# Patient Record
Sex: FEMALE | Race: WHITE | NOT HISPANIC OR LATINO
[De-identification: names, ages, dates, MRNs, and addresses within clinical notes are randomized per-mention and may not be internally consistent; named-entity substitution may affect disease eponyms.]

---

## 2017-08-31 PROBLEM — Z00.00 ENCOUNTER FOR PREVENTIVE HEALTH EXAMINATION: Status: ACTIVE | Noted: 2017-08-31

## 2017-09-25 ENCOUNTER — APPOINTMENT (OUTPATIENT)
Dept: INTERNAL MEDICINE | Facility: CLINIC | Age: 82
End: 2017-09-25

## 2021-05-13 ENCOUNTER — INPATIENT (INPATIENT)
Facility: HOSPITAL | Age: 86
LOS: 5 days | Discharge: NURSING, REHAB OR LONG-TERM CARE | DRG: 956 | End: 2021-05-19
Attending: STUDENT IN AN ORGANIZED HEALTH CARE EDUCATION/TRAINING PROGRAM | Admitting: STUDENT IN AN ORGANIZED HEALTH CARE EDUCATION/TRAINING PROGRAM
Payer: MEDICARE

## 2021-05-13 VITALS
OXYGEN SATURATION: 95 % | HEIGHT: 59 IN | WEIGHT: 130.07 LBS | RESPIRATION RATE: 16 BRPM | TEMPERATURE: 98 F | HEART RATE: 84 BPM | DIASTOLIC BLOOD PRESSURE: 60 MMHG | SYSTOLIC BLOOD PRESSURE: 109 MMHG

## 2021-05-13 LAB
APPEARANCE UR: ABNORMAL
APTT BLD: 27.3 SEC — LOW (ref 27.5–35.5)
BACTERIA # UR AUTO: PRESENT /HPF
BILIRUB UR-MCNC: NEGATIVE — SIGNIFICANT CHANGE UP
BLD GP AB SCN SERPL QL: NEGATIVE — SIGNIFICANT CHANGE UP
COLOR SPEC: YELLOW — SIGNIFICANT CHANGE UP
DIFF PNL FLD: ABNORMAL
EPI CELLS # UR: SIGNIFICANT CHANGE UP /HPF (ref 0–5)
GLUCOSE UR QL: NEGATIVE — SIGNIFICANT CHANGE UP
INR BLD: 0.97 — SIGNIFICANT CHANGE UP (ref 0.88–1.16)
KETONES UR-MCNC: 15 MG/DL
LEUKOCYTE ESTERASE UR-ACNC: NEGATIVE — SIGNIFICANT CHANGE UP
NITRITE UR-MCNC: POSITIVE
PH UR: 5.5 — SIGNIFICANT CHANGE UP (ref 5–8)
PROT UR-MCNC: 30 MG/DL
PROTHROM AB SERPL-ACNC: 11.6 SEC — SIGNIFICANT CHANGE UP (ref 10.6–13.6)
RBC CASTS # UR COMP ASSIST: < 5 /HPF — SIGNIFICANT CHANGE UP
RH IG SCN BLD-IMP: NEGATIVE — SIGNIFICANT CHANGE UP
SP GR SPEC: >=1.03 — SIGNIFICANT CHANGE UP (ref 1–1.03)
UROBILINOGEN FLD QL: 1 E.U./DL — SIGNIFICANT CHANGE UP
WBC UR QL: ABNORMAL /HPF

## 2021-05-13 PROCEDURE — 73552 X-RAY EXAM OF FEMUR 2/>: CPT | Mod: 26,LT

## 2021-05-13 PROCEDURE — 70450 CT HEAD/BRAIN W/O DYE: CPT | Mod: 26,MH

## 2021-05-13 PROCEDURE — 71250 CT THORAX DX C-: CPT | Mod: 26,MC

## 2021-05-13 PROCEDURE — 72100 X-RAY EXAM L-S SPINE 2/3 VWS: CPT | Mod: 26

## 2021-05-13 PROCEDURE — 71045 X-RAY EXAM CHEST 1 VIEW: CPT | Mod: 26

## 2021-05-13 PROCEDURE — 73521 X-RAY EXAM HIPS BI 2 VIEWS: CPT | Mod: 26

## 2021-05-13 PROCEDURE — 99285 EMERGENCY DEPT VISIT HI MDM: CPT | Mod: 25

## 2021-05-13 PROCEDURE — 72125 CT NECK SPINE W/O DYE: CPT | Mod: 26,MH

## 2021-05-13 PROCEDURE — 99223 1ST HOSP IP/OBS HIGH 75: CPT

## 2021-05-13 PROCEDURE — 72081 X-RAY EXAM ENTIRE SPI 1 VW: CPT | Mod: 26

## 2021-05-13 RX ORDER — ERGOCALCIFEROL 1.25 MG/1
0 CAPSULE ORAL
Qty: 0 | Refills: 0 | DISCHARGE

## 2021-05-13 RX ORDER — CEFTRIAXONE 500 MG/1
1000 INJECTION, POWDER, FOR SOLUTION INTRAMUSCULAR; INTRAVENOUS EVERY 24 HOURS
Refills: 0 | Status: COMPLETED | OUTPATIENT
Start: 2021-05-14 | End: 2021-05-15

## 2021-05-13 RX ORDER — ATORVASTATIN CALCIUM 80 MG/1
10 TABLET, FILM COATED ORAL AT BEDTIME
Refills: 0 | Status: DISCONTINUED | OUTPATIENT
Start: 2021-05-13 | End: 2021-05-19

## 2021-05-13 RX ORDER — CHLORHEXIDINE GLUCONATE 213 G/1000ML
1 SOLUTION TOPICAL EVERY 12 HOURS
Refills: 0 | Status: DISCONTINUED | OUTPATIENT
Start: 2021-05-13 | End: 2021-05-14

## 2021-05-13 RX ORDER — ACETAMINOPHEN 500 MG
650 TABLET ORAL EVERY 6 HOURS
Refills: 0 | Status: DISCONTINUED | OUTPATIENT
Start: 2021-05-13 | End: 2021-05-15

## 2021-05-13 RX ORDER — CEFTRIAXONE 500 MG/1
1000 INJECTION, POWDER, FOR SOLUTION INTRAMUSCULAR; INTRAVENOUS EVERY 24 HOURS
Refills: 0 | Status: DISCONTINUED | OUTPATIENT
Start: 2021-05-13 | End: 2021-05-13

## 2021-05-13 RX ORDER — SODIUM CHLORIDE 9 MG/ML
1000 INJECTION, SOLUTION INTRAVENOUS
Refills: 0 | Status: DISCONTINUED | OUTPATIENT
Start: 2021-05-13 | End: 2021-05-14

## 2021-05-13 RX ORDER — MORPHINE SULFATE 50 MG/1
2 CAPSULE, EXTENDED RELEASE ORAL ONCE
Refills: 0 | Status: DISCONTINUED | OUTPATIENT
Start: 2021-05-13 | End: 2021-05-13

## 2021-05-13 RX ORDER — TRAMADOL HYDROCHLORIDE 50 MG/1
50 TABLET ORAL EVERY 6 HOURS
Refills: 0 | Status: DISCONTINUED | OUTPATIENT
Start: 2021-05-13 | End: 2021-05-14

## 2021-05-13 RX ORDER — POVIDONE-IODINE 5 %
1 AEROSOL (ML) TOPICAL ONCE
Refills: 0 | Status: COMPLETED | OUTPATIENT
Start: 2021-05-13 | End: 2021-05-14

## 2021-05-13 RX ORDER — AMLODIPINE BESYLATE 2.5 MG/1
5 TABLET ORAL DAILY
Refills: 0 | Status: DISCONTINUED | OUTPATIENT
Start: 2021-05-13 | End: 2021-05-14

## 2021-05-13 RX ORDER — ACETAMINOPHEN 500 MG
1000 TABLET ORAL ONCE
Refills: 0 | Status: COMPLETED | OUTPATIENT
Start: 2021-05-13 | End: 2021-05-13

## 2021-05-13 RX ORDER — ATORVASTATIN CALCIUM 80 MG/1
0 TABLET, FILM COATED ORAL
Qty: 0 | Refills: 5 | DISCHARGE

## 2021-05-13 RX ORDER — TRAMADOL HYDROCHLORIDE 50 MG/1
25 TABLET ORAL EVERY 6 HOURS
Refills: 0 | Status: DISCONTINUED | OUTPATIENT
Start: 2021-05-13 | End: 2021-05-14

## 2021-05-13 RX ORDER — SODIUM CHLORIDE 9 MG/ML
1000 INJECTION INTRAMUSCULAR; INTRAVENOUS; SUBCUTANEOUS ONCE
Refills: 0 | Status: COMPLETED | OUTPATIENT
Start: 2021-05-13 | End: 2021-05-13

## 2021-05-13 RX ORDER — CEFTRIAXONE 500 MG/1
1000 INJECTION, POWDER, FOR SOLUTION INTRAMUSCULAR; INTRAVENOUS ONCE
Refills: 0 | Status: COMPLETED | OUTPATIENT
Start: 2021-05-13 | End: 2021-05-13

## 2021-05-13 RX ADMIN — CEFTRIAXONE 1000 MILLIGRAM(S): 500 INJECTION, POWDER, FOR SOLUTION INTRAMUSCULAR; INTRAVENOUS at 22:48

## 2021-05-13 RX ADMIN — Medication 1000 MILLIGRAM(S): at 21:45

## 2021-05-13 RX ADMIN — Medication 400 MILLIGRAM(S): at 21:30

## 2021-05-13 RX ADMIN — MORPHINE SULFATE 2 MILLIGRAM(S): 50 CAPSULE, EXTENDED RELEASE ORAL at 22:25

## 2021-05-13 RX ADMIN — CEFTRIAXONE 100 MILLIGRAM(S): 500 INJECTION, POWDER, FOR SOLUTION INTRAMUSCULAR; INTRAVENOUS at 22:18

## 2021-05-13 RX ADMIN — SODIUM CHLORIDE 1000 MILLILITER(S): 9 INJECTION INTRAMUSCULAR; INTRAVENOUS; SUBCUTANEOUS at 22:30

## 2021-05-13 RX ADMIN — MORPHINE SULFATE 2 MILLIGRAM(S): 50 CAPSULE, EXTENDED RELEASE ORAL at 22:10

## 2021-05-13 RX ADMIN — SODIUM CHLORIDE 1000 MILLILITER(S): 9 INJECTION INTRAMUSCULAR; INTRAVENOUS; SUBCUTANEOUS at 21:30

## 2021-05-13 NOTE — H&P ADULT - NSHPLABSRESULTS_GEN_ALL_CORE
Imaging: AP pelvis, AP/lateral left hip xrays were obtained at [facility] and reviewed. Findings include a displaced left femoral neck fracture.

## 2021-05-13 NOTE — H&P ADULT - HISTORY OF PRESENT ILLNESS
89F [facility] inner ear balance issue, "tight aortic valve", presenting with L hip pain after mechanical fall earlier today at home. Pt states she lives alone and fell, was finally able to crawl to phone to call for help. Denies head trauma/LOC. Reports pain L hip and R groin but otherwise no pain elsewhere. Denies numbness, tingling LLE.  89F [facility] inner ear balance issue, aortic valve stenosis, presenting with L hip pain after mechanical fall earlier today at home. Pt states she lives alone and fell, was finally able to crawl to phone to call for help. Denies head trauma/LOC. Reports pain L hip and R groin but otherwise no pain elsewhere. Denies numbness, tingling LLE. Pre-injury activity limited to mostly in-home activity. Able to walk 2-4 city blocks.

## 2021-05-13 NOTE — H&P ADULT - ASSESSMENT
89F with L Garden IV femoral neck fracture, for OR 5/14 for L hip hemiarthroplasty  - Pain control  - NPO/IVF  - Hold anticoagulation for OR  - Labs  - PT/INR, T+S  - CXR  - EKG  - UA  - Medical clearance  - MRSA nasal swab  - Discussed with attending

## 2021-05-13 NOTE — H&P ADULT - NSHPPHYSICALEXAM_GEN_ALL_CORE
Physical Exam:  General: Resting comfortably in stretcher. Alert and oriented. NAD.  Extremities:        LUE: No gross deformity. SILT C5-T1 distribution, symmetric. No TTP clavicle, shoulder, arm, elbow, forearm, wrist, hand. Painless ROM of shoulder, elbow, wrist. AIN/PIN/U motor intact. WWP, radial pulse 2+       RUE: No gross deformity. SILT C5-T1 distribution, symmetric. No TTP clavicle, shoulder, arm, elbow, forearm, wrist, hand. Painless ROM of shoulder, elbow, wrist. AIN/PIN/U motor intact. WWP, radial pulse 2+       LLE: Leg shortened, externally rotated. SILT L2-S1 distribution, symmetric. No TTP thigh, knee, leg, ankle, foot. Painless ankle, toes. TA/EHL/FHL/GS motor intact. WWP, DP 2+       RLE: Superficial abrasions and mild ecchymosis to R lateral knee, non TTP. SILT L2-S1 distribution, symmetric. No TTP GT, thigh, knee, leg, ankle, foot. No pain w/ log roll. Painless ROM hip, knee, ankle, toes. TA/EHL/FHL/GS motor intact 5/5, symmetrical. WWP, DP 2+

## 2021-05-13 NOTE — ED PROVIDER NOTE - CLINICAL SUMMARY MEDICAL DECISION MAKING FREE TEXT BOX
89 y/o F pt presents to ED with L hip fracture and rhabdomyolysis s/p fall. Will check labs, CT head/C-spine, hip XR, lumbar XR, give fluids, Tylenol IV for pain, consult ortho, and admit.

## 2021-05-13 NOTE — CONSULT NOTE ADULT - ASSESSMENT
Patient is a 89yo F PMH aortic stenosis, "inner ear balance issue," HTN, HLD, spinal stenosis, multiple syncopal episodes s/p PPM, diverticulosis presenting with L hip pain after mechanical fall earlier today at home. Found to have a left femoral neck fx. Admitted to ortho service with plan for L hip hemiarthroplasty. Medicine consulted for preop clearance.     #Preop evaluation   H/o AS suspect mod-severe also with PPM s/p multiple syncopal episodes. METs likely 4, reports being able to ambulate with walker 4 blocks before stopping for back pain and sob, performs all ADLs/IADLs. No h/o MI, CHF, CVA, DM. CT chest with mild cardiomegaly, peripheral/lower lobe predominant reticulation and mild groundglass opacities, bibasilar atelectasis, no consolidations.  - RCRI 0 (class 1 risk): 3.9%  - Lima periop risk: 0.3-1.1%  - Would obtain further info from pt's cardiologist Dr. Dave Jay ([facility]) regarding cardiac hx  - Will need echo for evaluation of AS prior to surgery       #UTI   Pt with leukocytosis, UA positive although denies symptoms but pt is a poor historian. No prior CBCs to compare.   - Would tx with Cefriaxone 1g for 3 days     #Elevated Tbili   Abd exam wnl. AST mildly elevated  - Monitor CMP     #Dysphagia   Pt coughing with sip of water   - Obtain speech and swallow consult     #Spinal stenosis   Known hx   - Tylenol prn pain     #Compression Fx  A L3 and T10 seen on CT chest, likely chronic.   - No further invention  - Pain management as above      Patient is a 89yo F PMH aortic stenosis, "inner ear balance issue," HTN, HLD, spinal stenosis, multiple syncopal episodes s/p PPM, diverticulosis presenting with L hip pain after mechanical fall earlier today at home. Found to have a left femoral neck fx. Admitted to ortho service with plan for L hip hemiarthroplasty. Medicine consulted for preop clearance.     #Preop evaluation   H/o AS suspect mod-severe also with PPM s/p multiple syncopal episodes. METs likely 4, reports being able to ambulate with walker 4 blocks before stopping for back pain and sob, performs all ADLs/IADLs. No h/o MI, CHF, CVA, DM. CT chest with mild cardiomegaly, peripheral/lower lobe predominant reticulation and mild groundglass opacities, bibasilar atelectasis, no consolidations.  - RCRI 0 (class 1 risk): 3.9%  - Lima periop risk: 0.3-1.1%  - Would obtain further info from pt's cardiologist Dr. Dave Jay ([facility]) regarding cardiac hx  - Will need echo for evaluation of AS prior to surgery   - Please obtain cardiology consult in the morning once echo is performed     #UTI   Pt with leukocytosis, UA positive although denies symptoms but pt is a poor historian. No prior CBCs to compare.   - Would tx with Ceftriaxone 1g for 3 days     #Elevated Tbili   Abd exam wnl. AST mildly elevated  - Monitor CMP     #Dysphagia   Pt coughing with sip of water (failed bedside dysphagia)   - Keep NPO   - Obtain speech and swallow consult    #Spinal stenosis   Known hx   - Tylenol prn pain     #Compression Fx  A L3 and T10 seen on CT chest, likely chronic.   - No further invention  - Pain management as above      Patient is a 89yo F PMH aortic stenosis, "inner ear balance issue," HTN, HLD, spinal stenosis, multiple syncopal episodes s/p PPM, diverticulosis presenting with L hip pain after mechanical fall earlier today at home. Found to have a left femoral neck fx. Admitted to ortho service with plan for L hip hemiarthroplasty. Medicine consulted for preop clearance.     #Preop evaluation   H/o AS suspect mod-severe also with PPM s/p multiple syncopal episodes. METs likely 4, reports being able to ambulate with walker 4 blocks before stopping for back pain and sob, performs all ADLs/IADLs. No h/o MI, CHF, CVA, DM. CT chest with mild cardiomegaly, peripheral/lower lobe predominant reticulation and mild groundglass opacities, bibasilar atelectasis, no consolidations.  - RCRI 0 (class 1 risk): 3.9%  - Lima periop risk: 0.3-1.1%  - Would obtain further info from pt's cardiologist Dr. Dave Jay ([facility]) regarding cardiac hx  - Will need echo for evaluation of AS prior to surgery   - Please obtain cardiology consult in the morning once echo is performed     #UTI   Pt with leukocytosis, UA positive although denies symptoms but pt is a poor historian. No prior CBCs to compare.   - Would tx with Ceftriaxone 1g for 3 days     #Elevated Tbili   Abd exam wnl. AST mildly elevated  - Monitor CMP     #Dysphagia   Pt coughing with sip of water (failed bedside dysphagia)   - Keep NPO   - Aspiration precautions  - Obtain speech and swallow consult    #Spinal stenosis   Known hx   - Tylenol prn pain     #Compression Fx  A L3 and T10 seen on CT chest, likely chronic.   - No further invention  - Pain management as above      Patient is a 89yo F PMH aortic stenosis, "inner ear balance issue," HTN, HLD, GERD, spinal stenosis, multiple syncopal episodes s/p PPM, diverticulosis presenting with L hip pain after mechanical fall earlier today at home. Found to have a left femoral neck fx. Admitted to ortho service with plan for L hip hemiarthroplasty. Medicine consulted for preop clearance.     #Preop evaluation   H/o AS suspect mod-severe also with PPM s/p multiple syncopal episodes. METs likely 4, reports being able to ambulate with walker 4 blocks before stopping for back pain and sob, performs all ADLs/IADLs. No h/o MI, CHF, CVA, DM. CT chest with mild cardiomegaly, peripheral/lower lobe predominant reticulation and mild groundglass opacities, bibasilar atelectasis, no consolidations.  - RCRI 0 (class 1 risk): 3.9%  - Lima periop risk: 0.3-1.1%  - Would obtain further info from pt's cardiologist Dr. Dave Jay ([facility]) regarding cardiac hx  - Will need echo for evaluation of AS prior to surgery   - Please obtain cardiology consult in the morning once echo is performed     #UTI   Pt with leukocytosis, UA positive although denies symptoms but pt is a poor historian. No prior CBCs to compare.   - Would tx with Ceftriaxone 1g for 3 days     #Elevated Tbili   Abd exam wnl. AST mildly elevated  - Monitor CMP     #Dysphagia   Pt coughing with sip of water (failed bedside dysphagia)   - Keep NPO   - Aspiration precautions  - Obtain speech and swallow consult    #GERD  On Nexium at home   - C/w Protonix 40mg     #Spinal stenosis   Known hx   - Tylenol prn pain     #Compression Fx  A L3 and T10 seen on CT chest, likely chronic.   - No further invention  - Pain management as above

## 2021-05-13 NOTE — ED ADULT NURSE NOTE - OBJECTIVE STATEMENT
Pt presents to ER s/p mechanical fall this AM and lying on floor for about 8hrs with L hip pain and external rotation, mid spinal pain, and neck pain. Pt states she was getting milk out of the refrigerator when she accidently turned quickly and fell. Someone didn't find her until 6pm. Skin intact. Pt denies AC use, unsure if she hit head. Pt a&ox3.

## 2021-05-13 NOTE — ED PROVIDER NOTE - OBJECTIVE STATEMENT
89 y/o F pt with no pertinent PMHx and PSHx presents to ED s/p trip and fall. Pt states she stumbled and tripped while reaching for her mug to drink from, falling onto the ground on her L hip. She was unable to get up or stand up on her own, but was able to crawl to the phone to call for help. In ED, pt has L leg shortened and externally rotated, is currently complaining of back pain, but is ao x 3 and VS stable. She denies LOC or any other acute complaints.

## 2021-05-13 NOTE — CONSULT NOTE ADULT - SUBJECTIVE AND OBJECTIVE BOX
SUBJECTIVE / INTERVAL HPI:   Patient is a 89yo F PMH aortic stenosis, "inner ear balance issue," HTN, HLD, spinal stenosis, multiple syncopal episodes s/p PPM, diverticulosis presenting with L hip pain after mechanical fall earlier today at home. Pt states she lives alone and fell due to her balance issue, was finally able to crawl to phone to call for help. Denies head trauma/LOC. Reports pain L hip and R groin but otherwise no pain elsewhere. Denies numbness, tingling LLE. Admits to 1 other fall this past year. Ambulates with a walker. Found to have a left femoral neck fx. Admitted to ortho service with plan for L hip hemiarthroplasty.     Medicine consulted for preop clearance. Patient seen and examined at bedside. Admits to hip/groin pain. Follows with cardiologist Dr. Dave Jay at [facility]. Was told she may need surgery for the AS and was planned to get a repeat echo for further evaluation but due to the pandemic her appt was cancelled. Denies h/o lung dz, DM, CVA, CHF. MI. On ROS, denies fevers, chills, HA, chest pain, sob, nausea, vomiting, abdominal pain, diarrhea, constipation, dysuria, urinary frequency/urgency/hesitancy. Lives along, performs all ADLs and IADLs. Says she can walk four blocks with walker before she has to stop due to back pain and sometimes SOB.     Of note, during bedside assessment pt coughing after drinking water with brief desaturation episode. Pt placed on 6L NC with improvement which was then titrated down to 2L.     PMH: as above  PSH: ppm   Meds: Losartan 25mg, Norvasc 5mg, Nexium, Lipitor 10mg   Allergies: Ciprofloxacin, Metronidazole, Amoxicillin   FH: Lung ca- father, aortic aneurysm- mother  SH: denies tobacco or drug use. Drinks 2 glasses of Irasema per night (denies h/o alcohol withdrawal          VITAL SIGNS:  Vital Signs Last 24 Hrs  T(C): 36.9 (13 May 2021 23:55), Max: 36.9 (13 May 2021 23:55)  T(F): 98.4 (13 May 2021 23:55), Max: 98.4 (13 May 2021 23:55)  HR: 79 (13 May 2021 23:55) (76 - 84)  BP: 128/58 (13 May 2021 23:55) (109/60 - 128/58)  BP(mean): --  RR: 16 (13 May 2021 23:55) (16 - 16)  SpO2: 95% (13 May 2021 23:55) (94% - 95%)    PHYSICAL EXAM:    General: elderly female, appears younger than stated age   HEENT: NC/AT; PERRL, anicteric sclera; MMM  Neck: supple  Cardiovascular: +S1/S2; RRR, grade 3 crescendo-descrendo murmur heard loudest at RUSB  Respiratory: CTA B/L; no W/R/R  Gastrointestinal: soft, NT/ND; +BSx4  Extremities: WWP; 1+ pitting edema of LEs b/l; no clubbing or cyanosis  Vascular: 2+ radial, DP/PT pulses B/L  Neurological: AAOx3, no focal deficits     MEDICATIONS:  MEDICATIONS  (STANDING):  acetaminophen   Tablet .. 650 milliGRAM(s) Oral every 6 hours  amLODIPine   Tablet 5 milliGRAM(s) Oral daily  atorvastatin 10 milliGRAM(s) Oral at bedtime  cefTRIAXone   IVPB 1000 milliGRAM(s) IV Intermittent every 24 hours  chlorhexidine 2% Cloths 1 Application(s) Topical every 12 hours  lactated ringers. 1000 milliLiter(s) (110 mL/Hr) IV Continuous <Continuous>  povidone iodine 5% Nasal Swab 1 Application(s) Both Nostrils once    MEDICATIONS  (PRN):  traMADol 25 milliGRAM(s) Oral every 6 hours PRN Moderate Pain (4 - 6)  traMADol 50 milliGRAM(s) Oral every 6 hours PRN Severe Pain (7 - 10)      ALLERGIES:  Allergies    No Known Allergies    Intolerances        LABS:                        14.8   18.95 )-----------( 261      ( 13 May 2021 19:57 )             44.6     05-13    143  |  104  |  18  ----------------------------<  165<H>  4.0   |  25  |  0.69    Ca    9.3      13 May 2021 19:57    TPro  7.3  /  Alb  3.8  /  TBili  1.4<H>  /  DBili  x   /  AST  52<H>  /  ALT  20  /  AlkPhos  69  05-13    PT/INR - ( 13 May 2021 20:28 )   PT: 11.6 sec;   INR: 0.97          PTT - ( 13 May 2021 20:28 )  PTT:27.3 sec  Urinalysis Basic - ( 13 May 2021 21:45 )    Color: Yellow / Appearance: SL Cloudy / SG: >=1.030 / pH: x  Gluc: x / Ketone: 15 mg/dL  / Bili: Negative / Urobili: 1.0 E.U./dL   Blood: x / Protein: 30 mg/dL / Nitrite: POSITIVE   Leuk Esterase: NEGATIVE / RBC: < 5 /HPF / WBC 5-10 /HPF   Sq Epi: x / Non Sq Epi: 0-5 /HPF / Bacteria: Present /HPF      CAPILLARY BLOOD GLUCOSE          RADIOLOGY & ADDITIONAL TESTS: Reviewed.  EKG: nsr with first degree av block, lad, likely lvh  SUBJECTIVE / INTERVAL HPI:   Patient is a 89yo F PMH aortic stenosis, "inner ear balance issue," HTN, HLD, GERD, spinal stenosis, multiple syncopal episodes s/p PPM, diverticulosis presenting with L hip pain after mechanical fall earlier today at home. Pt states she lives alone and fell due to her balance issue, was finally able to crawl to phone to call for help. Denies head trauma/LOC. Reports pain L hip and R groin but otherwise no pain elsewhere. Denies numbness, tingling LLE. Admits to 1 other fall this past year. Ambulates with a walker. Found to have a left femoral neck fx. Admitted to ortho service with plan for L hip hemiarthroplasty.     Medicine consulted for preop clearance. Patient seen and examined at bedside. Admits to hip/groin pain. Follows with cardiologist Dr. Dave Jay at [facility]. Was told she may need surgery for the AS and was planned to get a repeat echo for further evaluation but due to the pandemic her appt was cancelled. Denies h/o lung dz, DM, CVA, CHF. MI. On ROS, denies fevers, chills, HA, chest pain, sob, nausea, vomiting, abdominal pain, diarrhea, constipation, dysuria, urinary frequency/urgency/hesitancy. Lives along, performs all ADLs and IADLs. Says she can walk four blocks with walker before she has to stop due to back pain and sometimes SOB.     Of note, during bedside assessment pt coughing after drinking water with brief desaturation episode. Pt placed on 6L NC with improvement which was then titrated down to 2L.     PMH: as above  PSH: ppm   Meds: Losartan 25mg, Norvasc 5mg, Nexium, Lipitor 10mg   Allergies: Ciprofloxacin, Metronidazole, Amoxicillin   FH: Lung ca- father, aortic aneurysm- mother  SH: denies tobacco or drug use. Drinks 2 glasses of Irasema per night (denies h/o alcohol withdrawal          VITAL SIGNS:  Vital Signs Last 24 Hrs  T(C): 36.9 (13 May 2021 23:55), Max: 36.9 (13 May 2021 23:55)  T(F): 98.4 (13 May 2021 23:55), Max: 98.4 (13 May 2021 23:55)  HR: 79 (13 May 2021 23:55) (76 - 84)  BP: 128/58 (13 May 2021 23:55) (109/60 - 128/58)  BP(mean): --  RR: 16 (13 May 2021 23:55) (16 - 16)  SpO2: 95% (13 May 2021 23:55) (94% - 95%)    PHYSICAL EXAM:    General: elderly female, appears younger than stated age   HEENT: NC/AT; PERRL, anicteric sclera; MMM  Neck: supple  Cardiovascular: +S1/S2; RRR, grade 3 crescendo-descrendo murmur heard loudest at RUSB  Respiratory: CTA B/L; no W/R/R  Gastrointestinal: soft, NT/ND; +BSx4  Extremities: WWP; 1+ pitting edema of LEs b/l; no clubbing or cyanosis  Vascular: 2+ radial, DP/PT pulses B/L  Neurological: AAOx3, no focal deficits     MEDICATIONS:  MEDICATIONS  (STANDING):  acetaminophen   Tablet .. 650 milliGRAM(s) Oral every 6 hours  amLODIPine   Tablet 5 milliGRAM(s) Oral daily  atorvastatin 10 milliGRAM(s) Oral at bedtime  cefTRIAXone   IVPB 1000 milliGRAM(s) IV Intermittent every 24 hours  chlorhexidine 2% Cloths 1 Application(s) Topical every 12 hours  lactated ringers. 1000 milliLiter(s) (110 mL/Hr) IV Continuous <Continuous>  povidone iodine 5% Nasal Swab 1 Application(s) Both Nostrils once    MEDICATIONS  (PRN):  traMADol 25 milliGRAM(s) Oral every 6 hours PRN Moderate Pain (4 - 6)  traMADol 50 milliGRAM(s) Oral every 6 hours PRN Severe Pain (7 - 10)      ALLERGIES:  Allergies    No Known Allergies    Intolerances        LABS:                        14.8   18.95 )-----------( 261      ( 13 May 2021 19:57 )             44.6     05-13    143  |  104  |  18  ----------------------------<  165<H>  4.0   |  25  |  0.69    Ca    9.3      13 May 2021 19:57    TPro  7.3  /  Alb  3.8  /  TBili  1.4<H>  /  DBili  x   /  AST  52<H>  /  ALT  20  /  AlkPhos  69  05-13    PT/INR - ( 13 May 2021 20:28 )   PT: 11.6 sec;   INR: 0.97          PTT - ( 13 May 2021 20:28 )  PTT:27.3 sec  Urinalysis Basic - ( 13 May 2021 21:45 )    Color: Yellow / Appearance: SL Cloudy / SG: >=1.030 / pH: x  Gluc: x / Ketone: 15 mg/dL  / Bili: Negative / Urobili: 1.0 E.U./dL   Blood: x / Protein: 30 mg/dL / Nitrite: POSITIVE   Leuk Esterase: NEGATIVE / RBC: < 5 /HPF / WBC 5-10 /HPF   Sq Epi: x / Non Sq Epi: 0-5 /HPF / Bacteria: Present /HPF      CAPILLARY BLOOD GLUCOSE          RADIOLOGY & ADDITIONAL TESTS: Reviewed.  EKG: nsr with first degree av block, lad, likely lvh

## 2021-05-13 NOTE — CONSULT NOTE ADULT - ATTENDING COMMENTS
reviewed pertinent data , h&p  patient seen and examined overnight     PE as above w/ following exceptions/ additions: oriented x 3, in NAD, dry MM, no JVD, murmur as above, lungs CTA b/l (limited posterior exam 2/2 decreased ROM from hip fx); +SCDs b/l   pt on 3LPM NC o/n, weaned down to RA, pox 90-92% increased to 94% on 2LPM ; +alonzo in place draining dark urine    1. preop: pt w/ good function status, states that she ambulates ~4 blocks w/ a walker; concern however for untreated AS, agree w/ ECHO and cards evaluation. In addition pt w/ hypoxia o/n, suspect aspiration event witnessed by PGY2 Dr. Bergman, recommend CXR, monitor respiratory status. pt appears dry on exam, would c/w IVFs but a lower rate, monitor fluid status, dc fluids if signs of overload. agree w/ treating for UTI given fall.        rest of  plan as above reviewed pertinent data , h&p  patient seen and examined overnight     PE as above w/ following exceptions/ additions: oriented x 3, in NAD, dry MM, no JVD, murmur as above, lungs CTA b/l (limited posterior exam 2/2 decreased ROM from hip fx); +SCDs b/l   pt on 3LPM NC o/n, weaned down to RA, pox 90-92% increased to 94% on 2LPM ; +alonzo in place draining dark urine    1. preop: pt w/ good function status, states that she ambulates ~4 blocks w/ a walker; concern however for untreated AS, agree w/ ECHO and cards evaluation. In addition pt w/ hypoxia o/n, suspect aspiration event witnessed by PGY2 Dr. Bergman, recommend CXR, monitor respiratory status. pt appears dry on exam, would c/w IVFs but at a lower rate, monitor fluid status, dc fluids if signs of overload. agree w/ treating for UTI given fall.        rest of  plan as above reviewed pertinent data , h&p  patient seen and examined overnight     PE as above w/ following exceptions/ additions: oriented x 3, in NAD, dry MM, no JVD, murmur as above, lungs CTA b/l (limited posterior exam 2/2 decreased ROM from hip fx); PPM in place at left pectoral region;  +SCDs b/l   pt on 3LPM NC o/n, weaned down to RA, pox 90-92% increased to 94% on 2LPM ; +alonzo in place draining dark urine    1. preop: pt w/ good function status, states that she ambulates ~4 blocks w/ a walker; concern however for untreated AS, agree w/ ECHO and cards evaluation. In addition pt w/ hypoxia o/n, suspect aspiration event witnessed by PGY2 Dr. Bergman, recommend CXR, monitor respiratory status. pt appears dry on exam, would c/w IVFs but at a lower rate, monitor fluid status, dc fluids if signs of overload. agree w/ treating for UTI given fall.        rest of  plan as above

## 2021-05-13 NOTE — CONSULT NOTE ADULT - PROBLEM SELECTOR RECOMMENDATION 3
ADDENDUM: hypoxia o/n, requiring 3LPM NC, weaned down to room air, however pox 90-92%, increased to 94% on 2LPM; suspect possible aspiration given dysphagia event o/n, followup AM CXR, decrease fluid rate, monitor for overload

## 2021-05-13 NOTE — ED ADULT TRIAGE NOTE - OTHER COMPLAINTS
pt reports mechanical fall this morning, unable to get up independently. pt on floor >8 hours. presents with shortening and rotation to L leg. c.o L hip pain, back and neck pain. denies loc.

## 2021-05-13 NOTE — H&P ADULT - ATTENDING COMMENTS
The patient was counseled in detail regarding the diagnosis, treatment options available, prognosis of each treatment option and the potential risks and complications. The risks or surgery include, but are not limited to, anesthetic death, neurovascular complications, pulmonary embolism, deep vein thrombosis, wound dehiscence, failure of any or all of the discussed procedures infection of the joint or surrounding soft tissue, need for revision surgery, chronic pain, limitations in activities of daily living, inability to return to work, and loss of normal range of motion or functional use of the extremity. The patient understands that additional surgery may be indicated and that a surgical assistant may be required for the procedure.   The patient is aware of and understands these risks, and wishes to proceed with the proposed surgical procedure. Informed consent was signed.  Preoperative instructions were reviewed with the patient. Preoperative laboratory data will be obtained per the medical team, anesthesia and orthopedic teams.    Yonny Nj MD  Orthopaedic Surgery    [address] [place] [id number]  Office: [phone]

## 2021-05-14 ENCOUNTER — APPOINTMENT (OUTPATIENT)
Dept: CARDIOTHORACIC SURGERY | Facility: HOSPITAL | Age: 86
End: 2021-05-14

## 2021-05-14 DIAGNOSIS — K21.9 GASTRO-ESOPHAGEAL REFLUX DISEASE WITHOUT ESOPHAGITIS: ICD-10-CM

## 2021-05-14 DIAGNOSIS — I35.0 NONRHEUMATIC AORTIC (VALVE) STENOSIS: ICD-10-CM

## 2021-05-14 DIAGNOSIS — Z01.818 ENCOUNTER FOR OTHER PREPROCEDURAL EXAMINATION: ICD-10-CM

## 2021-05-14 DIAGNOSIS — M48.00 SPINAL STENOSIS, SITE UNSPECIFIED: ICD-10-CM

## 2021-05-14 DIAGNOSIS — S32.030S WEDGE COMPRESSION FRACTURE OF THIRD LUMBAR VERTEBRA, SEQUELA: ICD-10-CM

## 2021-05-14 DIAGNOSIS — S72.002A FRACTURE OF UNSPECIFIED PART OF NECK OF LEFT FEMUR, INITIAL ENCOUNTER FOR CLOSED FRACTURE: ICD-10-CM

## 2021-05-14 DIAGNOSIS — J96.01 ACUTE RESPIRATORY FAILURE WITH HYPOXIA: ICD-10-CM

## 2021-05-14 DIAGNOSIS — R13.10 DYSPHAGIA, UNSPECIFIED: ICD-10-CM

## 2021-05-14 DIAGNOSIS — N39.0 URINARY TRACT INFECTION, SITE NOT SPECIFIED: ICD-10-CM

## 2021-05-14 DIAGNOSIS — S32.010S WEDGE COMPRESSION FRACTURE OF FIRST LUMBAR VERTEBRA, SEQUELA: ICD-10-CM

## 2021-05-14 LAB
ANION GAP SERPL CALC-SCNC: 10 MMOL/L — SIGNIFICANT CHANGE UP (ref 5–17)
ANION GAP SERPL CALC-SCNC: 9 MMOL/L — SIGNIFICANT CHANGE UP (ref 5–17)
APTT BLD: 28.2 SEC — SIGNIFICANT CHANGE UP (ref 27.5–35.5)
BLD GP AB SCN SERPL QL: NEGATIVE — SIGNIFICANT CHANGE UP
BUN SERPL-MCNC: 15 MG/DL — SIGNIFICANT CHANGE UP (ref 7–23)
BUN SERPL-MCNC: 16 MG/DL — SIGNIFICANT CHANGE UP (ref 7–23)
CALCIUM SERPL-MCNC: 8.1 MG/DL — LOW (ref 8.4–10.5)
CALCIUM SERPL-MCNC: 8.3 MG/DL — LOW (ref 8.4–10.5)
CHLORIDE SERPL-SCNC: 107 MMOL/L — SIGNIFICANT CHANGE UP (ref 96–108)
CHLORIDE SERPL-SCNC: 107 MMOL/L — SIGNIFICANT CHANGE UP (ref 96–108)
CO2 SERPL-SCNC: 23 MMOL/L — SIGNIFICANT CHANGE UP (ref 22–31)
CO2 SERPL-SCNC: 25 MMOL/L — SIGNIFICANT CHANGE UP (ref 22–31)
COVID-19 SPIKE DOMAIN AB INTERP: POSITIVE
COVID-19 SPIKE DOMAIN ANTIBODY RESULT: >250 U/ML — HIGH
CREAT SERPL-MCNC: 0.58 MG/DL — SIGNIFICANT CHANGE UP (ref 0.5–1.3)
CREAT SERPL-MCNC: 0.65 MG/DL — SIGNIFICANT CHANGE UP (ref 0.5–1.3)
GAS PNL BLDA: SIGNIFICANT CHANGE UP
GLUCOSE SERPL-MCNC: 107 MG/DL — HIGH (ref 70–99)
GLUCOSE SERPL-MCNC: 132 MG/DL — HIGH (ref 70–99)
HCT VFR BLD CALC: 32.4 % — LOW (ref 34.5–45)
HCT VFR BLD CALC: 37.8 % — SIGNIFICANT CHANGE UP (ref 34.5–45)
HGB BLD-MCNC: 10.5 G/DL — LOW (ref 11.5–15.5)
HGB BLD-MCNC: 12.2 G/DL — SIGNIFICANT CHANGE UP (ref 11.5–15.5)
INR BLD: 1.04 — SIGNIFICANT CHANGE UP (ref 0.88–1.16)
INR BLD: 1.23 — HIGH (ref 0.88–1.16)
MAGNESIUM SERPL-MCNC: 1.7 MG/DL — SIGNIFICANT CHANGE UP (ref 1.6–2.6)
MCHC RBC-ENTMCNC: 28.6 PG — SIGNIFICANT CHANGE UP (ref 27–34)
MCHC RBC-ENTMCNC: 28.6 PG — SIGNIFICANT CHANGE UP (ref 27–34)
MCHC RBC-ENTMCNC: 32.3 GM/DL — SIGNIFICANT CHANGE UP (ref 32–36)
MCHC RBC-ENTMCNC: 32.4 GM/DL — SIGNIFICANT CHANGE UP (ref 32–36)
MCV RBC AUTO: 88.3 FL — SIGNIFICANT CHANGE UP (ref 80–100)
MCV RBC AUTO: 88.5 FL — SIGNIFICANT CHANGE UP (ref 80–100)
NRBC # BLD: 0 /100 WBCS — SIGNIFICANT CHANGE UP (ref 0–0)
NRBC # BLD: 0 /100 WBCS — SIGNIFICANT CHANGE UP (ref 0–0)
NT-PROBNP SERPL-SCNC: 749 PG/ML — HIGH (ref 0–300)
PHOSPHATE SERPL-MCNC: 2.5 MG/DL — SIGNIFICANT CHANGE UP (ref 2.5–4.5)
PLATELET # BLD AUTO: 152 K/UL — SIGNIFICANT CHANGE UP (ref 150–400)
PLATELET # BLD AUTO: 207 K/UL — SIGNIFICANT CHANGE UP (ref 150–400)
POTASSIUM SERPL-MCNC: 3.5 MMOL/L — SIGNIFICANT CHANGE UP (ref 3.5–5.3)
POTASSIUM SERPL-MCNC: 3.7 MMOL/L — SIGNIFICANT CHANGE UP (ref 3.5–5.3)
POTASSIUM SERPL-SCNC: 3.5 MMOL/L — SIGNIFICANT CHANGE UP (ref 3.5–5.3)
POTASSIUM SERPL-SCNC: 3.7 MMOL/L — SIGNIFICANT CHANGE UP (ref 3.5–5.3)
PROTHROM AB SERPL-ACNC: 12.5 SEC — SIGNIFICANT CHANGE UP (ref 10.6–13.6)
PROTHROM AB SERPL-ACNC: 14.6 SEC — HIGH (ref 10.6–13.6)
RBC # BLD: 3.67 M/UL — LOW (ref 3.8–5.2)
RBC # BLD: 4.27 M/UL — SIGNIFICANT CHANGE UP (ref 3.8–5.2)
RBC # FLD: 14.7 % — HIGH (ref 10.3–14.5)
RBC # FLD: 14.8 % — HIGH (ref 10.3–14.5)
RH IG SCN BLD-IMP: NEGATIVE — SIGNIFICANT CHANGE UP
SARS-COV-2 IGG+IGM SERPL QL IA: >250 U/ML — HIGH
SARS-COV-2 IGG+IGM SERPL QL IA: POSITIVE
SARS-COV-2 RNA SPEC QL NAA+PROBE: SIGNIFICANT CHANGE UP
SODIUM SERPL-SCNC: 140 MMOL/L — SIGNIFICANT CHANGE UP (ref 135–145)
SODIUM SERPL-SCNC: 141 MMOL/L — SIGNIFICANT CHANGE UP (ref 135–145)
WBC # BLD: 14.09 K/UL — HIGH (ref 3.8–10.5)
WBC # BLD: 15.01 K/UL — HIGH (ref 3.8–10.5)
WBC # FLD AUTO: 14.09 K/UL — HIGH (ref 3.8–10.5)
WBC # FLD AUTO: 15.01 K/UL — HIGH (ref 3.8–10.5)

## 2021-05-14 PROCEDURE — 93452 LEFT HRT CATH W/VENTRCLGRPHY: CPT | Mod: 26

## 2021-05-14 PROCEDURE — 92986 REVISION OF AORTIC VALVE: CPT

## 2021-05-14 PROCEDURE — 99233 SBSQ HOSP IP/OBS HIGH 50: CPT

## 2021-05-14 PROCEDURE — 71045 X-RAY EXAM CHEST 1 VIEW: CPT | Mod: 26

## 2021-05-14 PROCEDURE — 93010 ELECTROCARDIOGRAM REPORT: CPT

## 2021-05-14 PROCEDURE — 93306 TTE W/DOPPLER COMPLETE: CPT | Mod: 26

## 2021-05-14 PROCEDURE — 99222 1ST HOSP IP/OBS MODERATE 55: CPT | Mod: 25

## 2021-05-14 RX ORDER — CHLORHEXIDINE GLUCONATE 213 G/1000ML
1 SOLUTION TOPICAL ONCE
Refills: 0 | Status: COMPLETED | OUTPATIENT
Start: 2021-05-14 | End: 2021-05-14

## 2021-05-14 RX ORDER — POTASSIUM CHLORIDE 20 MEQ
40 PACKET (EA) ORAL ONCE
Refills: 0 | Status: COMPLETED | OUTPATIENT
Start: 2021-05-14 | End: 2021-05-14

## 2021-05-14 RX ORDER — MAGNESIUM SULFATE 500 MG/ML
2 VIAL (ML) INJECTION ONCE
Refills: 0 | Status: COMPLETED | OUTPATIENT
Start: 2021-05-14 | End: 2021-05-14

## 2021-05-14 RX ORDER — CHLORHEXIDINE GLUCONATE 213 G/1000ML
10 SOLUTION TOPICAL ONCE
Refills: 0 | Status: COMPLETED | OUTPATIENT
Start: 2021-05-14 | End: 2021-05-14

## 2021-05-14 RX ORDER — POTASSIUM CHLORIDE 20 MEQ
20 PACKET (EA) ORAL
Refills: 0 | Status: COMPLETED | OUTPATIENT
Start: 2021-05-14 | End: 2021-05-15

## 2021-05-14 RX ORDER — SODIUM CHLORIDE 9 MG/ML
1000 INJECTION, SOLUTION INTRAVENOUS
Refills: 0 | Status: DISCONTINUED | OUTPATIENT
Start: 2021-05-14 | End: 2021-05-14

## 2021-05-14 RX ADMIN — Medication 650 MILLIGRAM(S): at 07:30

## 2021-05-14 RX ADMIN — CHLORHEXIDINE GLUCONATE 1 APPLICATION(S): 213 SOLUTION TOPICAL at 06:58

## 2021-05-14 RX ADMIN — CHLORHEXIDINE GLUCONATE 1 APPLICATION(S): 213 SOLUTION TOPICAL at 13:25

## 2021-05-14 RX ADMIN — CHLORHEXIDINE GLUCONATE 10 MILLILITER(S): 213 SOLUTION TOPICAL at 13:25

## 2021-05-14 RX ADMIN — Medication 50 MILLIEQUIVALENT(S): at 19:40

## 2021-05-14 RX ADMIN — Medication 650 MILLIGRAM(S): at 12:26

## 2021-05-14 RX ADMIN — Medication 650 MILLIGRAM(S): at 01:01

## 2021-05-14 RX ADMIN — Medication 1 APPLICATION(S): at 13:29

## 2021-05-14 RX ADMIN — Medication 50 GRAM(S): at 20:09

## 2021-05-14 RX ADMIN — Medication 325 MILLIGRAM(S): at 06:43

## 2021-05-14 RX ADMIN — AMLODIPINE BESYLATE 5 MILLIGRAM(S): 2.5 TABLET ORAL at 06:29

## 2021-05-14 RX ADMIN — SODIUM CHLORIDE 110 MILLILITER(S): 9 INJECTION, SOLUTION INTRAVENOUS at 00:10

## 2021-05-14 RX ADMIN — TRAMADOL HYDROCHLORIDE 25 MILLIGRAM(S): 50 TABLET ORAL at 07:30

## 2021-05-14 RX ADMIN — CEFTRIAXONE 100 MILLIGRAM(S): 500 INJECTION, POWDER, FOR SOLUTION INTRAMUSCULAR; INTRAVENOUS at 22:46

## 2021-05-14 RX ADMIN — TRAMADOL HYDROCHLORIDE 25 MILLIGRAM(S): 50 TABLET ORAL at 06:34

## 2021-05-14 RX ADMIN — Medication 650 MILLIGRAM(S): at 00:14

## 2021-05-14 RX ADMIN — Medication 650 MILLIGRAM(S): at 12:16

## 2021-05-14 NOTE — PROGRESS NOTE ADULT - SUBJECTIVE AND OBJECTIVE BOX
OVERNIGHT EVENTS: EVER, cardiology consulted    SUBJECTIVE / INTERVAL HPI: Patient seen and examined at bedside. Echo w/ severe AS, now planned for BAV w/ structural heart today.     Seen prior to OR. Denies SOB, pain is controlled. Former nurse.     VITAL SIGNS:  Vital Signs Last 24 Hrs  T(C): 36.4 (14 May 2021 13:47), Max: 36.9 (13 May 2021 23:55)  T(F): 97.5 (14 May 2021 13:47), Max: 98.4 (13 May 2021 23:55)  HR: 75 (14 May 2021 13:47) (67 - 84)  BP: 123/60 (14 May 2021 13:47) (109/60 - 138/60)  BP(mean): --  RR: 17 (14 May 2021 13:47) (16 - 18)  SpO2: 96% (14 May 2021 13:47) (94% - 96%)    PHYSICAL EXAM:    General: WDWN, pleasant, sharp, awake and alert  HEENT: NC/AT;   Neck: supple  Cardiovascular: +S1/S2; RRR, crescendo decrescendo systolic murmur  Respiratory: L base rales  Gastrointestinal: soft, NT/ND;   Extremities: WWP; L hip tender    MEDICATIONS:  MEDICATIONS  (STANDING):  acetaminophen   Tablet .. 650 milliGRAM(s) Oral every 6 hours  amLODIPine   Tablet 5 milliGRAM(s) Oral daily  atorvastatin 10 milliGRAM(s) Oral at bedtime  cefTRIAXone   IVPB 1000 milliGRAM(s) IV Intermittent every 24 hours  chlorhexidine 2% Cloths 1 Application(s) Topical every 12 hours  lactated ringers. 1000 milliLiter(s) (80 mL/Hr) IV Continuous <Continuous>    MEDICATIONS  (PRN):  traMADol 25 milliGRAM(s) Oral every 6 hours PRN Moderate Pain (4 - 6)  traMADol 50 milliGRAM(s) Oral every 6 hours PRN Severe Pain (7 - 10)      ALLERGIES:  Allergies    amoxicillin (Unknown)  ciprofloxacin (Unknown)  metronidazole (Unknown)    Intolerances        LABS:                        14.8   18.95 )-----------( 261      ( 13 May 2021 19:57 )             44.6     05-13    143  |  104  |  18  ----------------------------<  165<H>  4.0   |  25  |  0.69    Ca    9.3      13 May 2021 19:57    TPro  7.3  /  Alb  3.8  /  TBili  1.4<H>  /  DBili  x   /  AST  52<H>  /  ALT  20  /  AlkPhos  69  05-13    PT/INR - ( 13 May 2021 20:28 )   PT: 11.6 sec;   INR: 0.97          PTT - ( 13 May 2021 20:28 )  PTT:27.3 sec  Urinalysis Basic - ( 13 May 2021 21:45 )    Color: Yellow / Appearance: SL Cloudy / SG: >=1.030 / pH: x  Gluc: x / Ketone: 15 mg/dL  / Bili: Negative / Urobili: 1.0 E.U./dL   Blood: x / Protein: 30 mg/dL / Nitrite: POSITIVE   Leuk Esterase: NEGATIVE / RBC: < 5 /HPF / WBC 5-10 /HPF   Sq Epi: x / Non Sq Epi: 0-5 /HPF / Bacteria: Present /HPF      CAPILLARY BLOOD GLUCOSE      POCT Blood Glucose.: 101 mg/dL (14 May 2021 11:49)      RADIOLOGY & ADDITIONAL TESTS: Reviewed.    ASSESSMENT:    PLAN:

## 2021-05-14 NOTE — PATIENT PROFILE ADULT - CENTRAL VENOUS CATHETER/PICC LINE
A/P: 62 y/o M s/p L Tib Plateau Fx ExFix, for planned DIPTI/ORIF    hold dvt ppx  NWB LLE  Awaiting LLE Doppler official results  npo except meds  Pain management prn  ivf  plan for OR today for DIPTI and ORIF pending dr lenny ugerra of patients skin no

## 2021-05-14 NOTE — CONSULT NOTE ADULT - SUBJECTIVE AND OBJECTIVE BOX
Cardiology Consult    HPI: 89F PMH AS, HTN, PPM (syncopal episodes), spinal stenosis presented with mechanical fall. She states has inner ear balance issues and flet unsteady for which she fell. Denies LOC. No associated CP, SOB, no prodromal symptoms. Found to have L femoral neck fracture and admitted to orthopedics. Cardiology consulted for preoperative evaluation.   Telemetry:    OBJECTIVE  Vitals:  T(C): 36.1 (05-14-21 @ 06:25), Max: 36.9 (05-13-21 @ 23:55)  HR: 69 (05-14-21 @ 06:25) (67 - 84)  BP: 132/60 (05-14-21 @ 06:25) (109/60 - 133/62)  RR: 17 (05-14-21 @ 06:25) (16 - 18)  SpO2: 94% (05-14-21 @ 06:25) (94% - 96%)  Wt(kg): --    I/O:  I&O's Summary    13 May 2021 07:01  -  14 May 2021 07:00  --------------------------------------------------------  IN: 1480 mL / OUT: 0 mL / NET: 1480 mL        PHYSICAL EXAM:  Appearance: NAD. Speaking in full sentences.   HEENT: No pallor noted.  Conjunctiva clear b/l. Moist oral mucosa.  Cardiovascular: RRR with no murmurs.  Respiratory: Lungs CTAB.   Gastrointestinal:  Soft, nontender. Non-distended. Non-rigid.	  Extremities: No edema b/l. No erythema b/l. LE WWP b/l.  Vascular: DP intact  Neurologic:  Alert and awake. Moving all extremities. Following commands.   	  LABS:                        14.8   18.95 )-----------( 261      ( 13 May 2021 19:57 )             44.6     05-13    143  |  104  |  18  ----------------------------<  165<H>  4.0   |  25  |  0.69    Ca    9.3      13 May 2021 19:57    TPro  7.3  /  Alb  3.8  /  TBili  1.4<H>  /  DBili  x   /  AST  52<H>  /  ALT  20  /  AlkPhos  69  05-13    PT/INR - ( 13 May 2021 20:28 )   PT: 11.6 sec;   INR: 0.97          PTT - ( 13 May 2021 20:28 )  PTT:27.3 sec  Urinalysis Basic - ( 13 May 2021 21:45 )    Color: Yellow / Appearance: SL Cloudy / SG: >=1.030 / pH: x  Gluc: x / Ketone: 15 mg/dL  / Bili: Negative / Urobili: 1.0 E.U./dL   Blood: x / Protein: 30 mg/dL / Nitrite: POSITIVE   Leuk Esterase: NEGATIVE / RBC: < 5 /HPF / WBC 5-10 /HPF   Sq Epi: x / Non Sq Epi: 0-5 /HPF / Bacteria: Present /HPF        RADIOLOGY & ADDITIONAL TESTS:  Reviewed .    MEDICATIONS  (STANDING):  acetaminophen   Tablet .. 650 milliGRAM(s) Oral every 6 hours  amLODIPine   Tablet 5 milliGRAM(s) Oral daily  atorvastatin 10 milliGRAM(s) Oral at bedtime  cefTRIAXone   IVPB 1000 milliGRAM(s) IV Intermittent every 24 hours  chlorhexidine 2% Cloths 1 Application(s) Topical every 12 hours  lactated ringers. 1000 milliLiter(s) (80 mL/Hr) IV Continuous <Continuous>  povidone iodine 5% Nasal Swab 1 Application(s) Both Nostrils once    MEDICATIONS  (PRN):  traMADol 25 milliGRAM(s) Oral every 6 hours PRN Moderate Pain (4 - 6)  traMADol 50 milliGRAM(s) Oral every 6 hours PRN Severe Pain (7 - 10)     Cardiology Consult    HPI: 89F PMH AS, HTN, PPM (syncopal episodes), spinal stenosis presented with mechanical fall. She states has inner ear balance issues and flet unsteady for which she fell. Denies LOC. No associated CP, SOB, no prodromal symptoms. Found to have L femoral neck fracture and admitted to orthopedics. Cardiology consulted for preoperative evaluation. Patient denies any active chest pain, no shortness of breath or orthopnea. Denies dyspnea on exertion. States that she walks about 4 blocks with a walker, can walk up 4 stairs in her building. States that she has never had any angiogram. Follows with cardiologist Dr. Jay at [facility]. Patient states she has hx of AS, thinks shes been told its been moderate- last echo 6 mo to a year ago.   PMH: AS, HTN, PPM  Meds: Losartan 25mg, Norvasc 5mg, lipitor 10mg  Allergies: Cipro, amoxicillin and metronidazole  SHx: Denies tobacco use ever. Drinks 2 glasses of jc/night w/ dinner      OBJECTIVE  Vitals:  T(C): 36.1 (05-14-21 @ 06:25), Max: 36.9 (05-13-21 @ 23:55)  HR: 69 (05-14-21 @ 06:25) (67 - 84)  BP: 132/60 (05-14-21 @ 06:25) (109/60 - 133/62)  RR: 17 (05-14-21 @ 06:25) (16 - 18)  SpO2: 94% (05-14-21 @ 06:25) (94% - 96%)  Wt(kg): --    I/O:  I&O's Summary    13 May 2021 07:01  -  14 May 2021 07:00  --------------------------------------------------------  IN: 1480 mL / OUT: 0 mL / NET: 1480 mL        PHYSICAL EXAM:  Appearance: NAD. Speaking in full sentences.   HEENT: No pallor noted.  Conjunctiva clear b/l. Moist oral mucosa.  Cardiovascular: RRR, 3/6 systolic murmur  Respiratory: Lungs CTAB.   Gastrointestinal:  Soft, nontender. Non-distended. Non-rigid.	  Extremities: No edema b/l. No erythema b/l. LE WWP b/l.  Vascular: DP intact  Neurologic:  Alert and awake. Moving all extremities. Following commands.   	  LABS:                        14.8   18.95 )-----------( 261      ( 13 May 2021 19:57 )             44.6     05-13    143  |  104  |  18  ----------------------------<  165<H>  4.0   |  25  |  0.69    Ca    9.3      13 May 2021 19:57    TPro  7.3  /  Alb  3.8  /  TBili  1.4<H>  /  DBili  x   /  AST  52<H>  /  ALT  20  /  AlkPhos  69  05-13    PT/INR - ( 13 May 2021 20:28 )   PT: 11.6 sec;   INR: 0.97          PTT - ( 13 May 2021 20:28 )  PTT:27.3 sec  Urinalysis Basic - ( 13 May 2021 21:45 )    Color: Yellow / Appearance: SL Cloudy / SG: >=1.030 / pH: x  Gluc: x / Ketone: 15 mg/dL  / Bili: Negative / Urobili: 1.0 E.U./dL   Blood: x / Protein: 30 mg/dL / Nitrite: POSITIVE   Leuk Esterase: NEGATIVE / RBC: < 5 /HPF / WBC 5-10 /HPF   Sq Epi: x / Non Sq Epi: 0-5 /HPF / Bacteria: Present /HPF        RADIOLOGY & ADDITIONAL TESTS:  Reviewed .    MEDICATIONS  (STANDING):  acetaminophen   Tablet .. 650 milliGRAM(s) Oral every 6 hours  amLODIPine   Tablet 5 milliGRAM(s) Oral daily  atorvastatin 10 milliGRAM(s) Oral at bedtime  cefTRIAXone   IVPB 1000 milliGRAM(s) IV Intermittent every 24 hours  chlorhexidine 2% Cloths 1 Application(s) Topical every 12 hours  lactated ringers. 1000 milliLiter(s) (80 mL/Hr) IV Continuous <Continuous>  povidone iodine 5% Nasal Swab 1 Application(s) Both Nostrils once    MEDICATIONS  (PRN):  traMADol 25 milliGRAM(s) Oral every 6 hours PRN Moderate Pain (4 - 6)  traMADol 50 milliGRAM(s) Oral every 6 hours PRN Severe Pain (7 - 10)     Cardiology Consult    HPI: 89F PMH AS, HTN, PPM (syncopal episodes), spinal stenosis presented with mechanical fall. She states has inner ear balance issues and flet unsteady for which she fell. Denies LOC. No associated CP, SOB, no prodromal symptoms. Found to have L femoral neck fracture and admitted to orthopedics. Cardiology consulted for preoperative evaluation. Patient denies any active chest pain, no shortness of breath or orthopnea. Denies dyspnea on exertion. States that she walks about 4 blocks with a walker, can walk up 4 stairs in her building. States that she has never had any angiogram. Follows with cardiologist Dr. Jay at [facility]. Patient states she has hx of AS, thinks shes been told its been moderate- last echo 6 mo to a year ago.   PMH: AS, HTN, PPM  Meds: Losartan 25mg, Norvasc 5mg, lipitor 10mg  Allergies: Cipro, amoxicillin and metronidazole  SHx: Denies tobacco use ever. Drinks 2 glasses of jc/night w/ dinner      OBJECTIVE  Vitals:  T(C): 36.1 (05-14-21 @ 06:25), Max: 36.9 (05-13-21 @ 23:55)  HR: 69 (05-14-21 @ 06:25) (67 - 84)  BP: 132/60 (05-14-21 @ 06:25) (109/60 - 133/62)  RR: 17 (05-14-21 @ 06:25) (16 - 18)  SpO2: 94% (05-14-21 @ 06:25) (94% - 96%)  Wt(kg): --    I/O:  I&O's Summary    13 May 2021 07:01  -  14 May 2021 07:00  --------------------------------------------------------  IN: 1480 mL / OUT: 0 mL / NET: 1480 mL        PHYSICAL EXAM:  Appearance: NAD. Speaking in full sentences.   HEENT: No pallor noted.  Conjunctiva clear b/l. Moist oral mucosa.  Cardiovascular: RRR, 3/6 systolic murmur at RSB  Respiratory: Lungs CTAB.   Gastrointestinal:  Soft, nontender. Non-distended. Non-rigid.	  Extremities: No edema b/l. No erythema b/l. LE WWP b/l.  Vascular: DP intact  Neurologic:  Alert and awake. Moving all extremities. Following commands.   	  LABS:                        14.8   18.95 )-----------( 261      ( 13 May 2021 19:57 )             44.6     05-13    143  |  104  |  18  ----------------------------<  165<H>  4.0   |  25  |  0.69    Ca    9.3      13 May 2021 19:57    TPro  7.3  /  Alb  3.8  /  TBili  1.4<H>  /  DBili  x   /  AST  52<H>  /  ALT  20  /  AlkPhos  69  05-13    PT/INR - ( 13 May 2021 20:28 )   PT: 11.6 sec;   INR: 0.97          PTT - ( 13 May 2021 20:28 )  PTT:27.3 sec  Urinalysis Basic - ( 13 May 2021 21:45 )    Color: Yellow / Appearance: SL Cloudy / SG: >=1.030 / pH: x  Gluc: x / Ketone: 15 mg/dL  / Bili: Negative / Urobili: 1.0 E.U./dL   Blood: x / Protein: 30 mg/dL / Nitrite: POSITIVE   Leuk Esterase: NEGATIVE / RBC: < 5 /HPF / WBC 5-10 /HPF   Sq Epi: x / Non Sq Epi: 0-5 /HPF / Bacteria: Present /HPF        RADIOLOGY & ADDITIONAL TESTS:  Reviewed .    MEDICATIONS  (STANDING):  acetaminophen   Tablet .. 650 milliGRAM(s) Oral every 6 hours  amLODIPine   Tablet 5 milliGRAM(s) Oral daily  atorvastatin 10 milliGRAM(s) Oral at bedtime  cefTRIAXone   IVPB 1000 milliGRAM(s) IV Intermittent every 24 hours  chlorhexidine 2% Cloths 1 Application(s) Topical every 12 hours  lactated ringers. 1000 milliLiter(s) (80 mL/Hr) IV Continuous <Continuous>  povidone iodine 5% Nasal Swab 1 Application(s) Both Nostrils once    MEDICATIONS  (PRN):  traMADol 25 milliGRAM(s) Oral every 6 hours PRN Moderate Pain (4 - 6)  traMADol 50 milliGRAM(s) Oral every 6 hours PRN Severe Pain (7 - 10)

## 2021-05-14 NOTE — CONSULT NOTE ADULT - ASSESSMENT
ASSESSMENT:  89F PMH AS, HTN, PPM (syncopal episodes), spinal stenosis presented with mechanical fall and found to have L femoral neck fracture pending L hip hemiarthroplasty w/ ortho. Cardiology consulted for preoperative evaluation.    PLAN:  #Preoperative evaluation  No active chest pain. No SOB. No orthopnea. EKG demonstrates NSR w/ 1st degree avb, LVH based on david criteria  RCRI: 0/class I, 3.9% for MI, death or cardiac arrest at 30 days  [place]: 0.6% for MI or cardiac arrest at 30 days  NSQIP: 0.8% for cardiac complication  METS<4  - Please resume patients home statin, lipitor 10mg  - Resume home amlodipine 5mg  - Hold losartan 25mg  - Please obtain TTE    Recommendations final when signed by attending. ASSESSMENT:  89F PMH AS, HTN, PPM (syncopal episodes), spinal stenosis presented with mechanical fall and found to have L femoral neck fracture pending L hip hemiarthroplasty w/ ortho. Cardiology consulted for preoperative evaluation.    PLAN:  #Preoperative evaluation  No active chest pain. No SOB. No orthopnea. EKG demonstrates NSR w/ 1st degree avb, LVH based on david criteria  RCRI: 0/class I, 3.9% for MI, death or cardiac arrest at 30 days  [place]: 0.6% for MI or cardiac arrest at 30 days  NSQIP: 0.8% for cardiac complication  METS<4  Hx of AS, will need to further evaluate  - Please resume patients home statin, lipitor 10mg  - Resume home amlodipine 5mg  - Hold losartan 25mg  - Please obtain TTE    Recommendations final when signed by attending.

## 2021-05-14 NOTE — PRE-OP CHECKLIST - SELECT TESTS ORDERED
BMP/CBC/Type and Cross/Type and Screen/COVID-19 BMP/CBC/PT/PTT/INR/Type and Cross/Type and Screen/Urinalysis/EKG/COVID-19 101/BMP/CBC/PT/PTT/INR/Type and Cross/Type and Screen/Urinalysis/EKG/POCT Blood Glucose/COVID-19

## 2021-05-14 NOTE — CONSULT NOTE ADULT - SUBJECTIVE AND OBJECTIVE BOX
Surgeon: Dr. Fry    Requesting Physician: Dr. Nj    HISTORY OF PRESENT ILLNESS (Need 4):   89 year old F, PMHx  PMH AS, HTN, PPM (syncopal episodes), spinal stenosis presented with mechanical fall. She states has inner ear balance issues and felt unsteady for which she fell. Denies LOC. No associated CP, SOB, no prodromal symptoms. Found to have L femoral neck fracture and admitted to orthopedics. Cardiology consulted for preoperative evaluation. Patient denies any active chest pain, no shortness of breath or orthopnea. Denies dyspnea on exertion. States that she walks about 4 blocks with a walker, can walk up 4 stairs in her building. States that she has never had any angiogram. Follows with cardiologist Dr. Jay at [facility]. Patient states she has hx of AS, thinks shes been told its been moderate- last echo 6 mo to a year ago. Echo at [facility] revealed severe AS, NL EF. SHD Dr. Fry consulted for intervention prior to Orthopeadic surgery.     PAST MEDICAL & SURGICAL HISTORY:  No pertinent past medical history        MEDICATIONS  (STANDING):  acetaminophen   Tablet .. 650 milliGRAM(s) Oral every 6 hours  amLODIPine   Tablet 5 milliGRAM(s) Oral daily  atorvastatin 10 milliGRAM(s) Oral at bedtime  cefTRIAXone   IVPB 1000 milliGRAM(s) IV Intermittent every 24 hours  chlorhexidine 2% Cloths 1 Application(s) Topical every 12 hours  lactated ringers. 1000 milliLiter(s) (80 mL/Hr) IV Continuous <Continuous>  povidone iodine 5% Nasal Swab 1 Application(s) Both Nostrils once    MEDICATIONS  (PRN):  traMADol 25 milliGRAM(s) Oral every 6 hours PRN Moderate Pain (4 - 6)  traMADol 50 milliGRAM(s) Oral every 6 hours PRN Severe Pain (7 - 10)      Allergies    amoxicillin (Unknown)  ciprofloxacin (Unknown)  metronidazole (Unknown)    Intolerances        SOCIAL HISTORY:  Smoker: No  ETOH use:No  Ilicit Drug use: no  Occupation: n/a  Assisted device use (Cane / Walker): Walker  Live with: Independent of ADLs    FAMILY HISTORY:      Review of Systems (Need 10):  CONSTITUTIONAL: Denies fevers / chills, sweats, fatigue, weight loss, weight gain                                       NEURO:  + "undsteadiness" Denies parathesias, seizures, syncope, confusion                                                                                  EYES:  Denies blurry vision, discharge, pain, loss of vision                                                                                    ENMT:  Denies difficulty hearing, vertigo, dysphagia, epistaxis, recent dental work                                       CV:  Denies chest pain, palpitations, STEPHENS, orthopnea                                                                                           RESPIRATORY:  Denies wWheezing, SOB, cough / sputum, hemoptysis                                                               GI:  Denies nausea, vomiting, diarrhea, constipation, melena                                                                          : Denies hematuria, dysuria, urgency, incontinence                                                                                          MUSKULOSKELETAL:  Denies arthritis, joint swelling, muscle weakness                                                             SKIN/BREAST:  Denies rash, itching, hair loss, masses                                                                                              PSYCH:  Denies depression, anxiety, suicidal ideation                                                                                                HEME/LYMPH:  Denies bruises easily, enlarged lymph nodes, tender lymph nodes                                          ENDOCRINE:  Denies cold intolerance, heat intolerance, polydipsia                                                                      Vital Signs Last 24 Hrs  T(C): 36.6 (14 May 2021 09:30), Max: 36.9 (13 May 2021 23:55)  T(F): 97.8 (14 May 2021 09:30), Max: 98.4 (13 May 2021 23:55)  HR: 75 (14 May 2021 09:30) (67 - 84)  BP: 138/60 (14 May 2021 09:30) (109/60 - 138/60)  BP(mean): --  RR: 16 (14 May 2021 09:30) (16 - 18)  SpO2: 96% (14 May 2021 09:30) (94% - 96%)    Physical Exam (Need 8)  CONSTITUTIONAL:  Laying comfortably in bed, nad, on NC                                                                          NEURO: AAOx3, no neuro deficits noted, CN grossly intact                 EYES:    WNL  ENMT:    WNL  CV:  S1s2, rrr, +III/VI CHIQUI   RESPIRATORY:   bibasilar crackles   GI:    +Bs, soft, nt/nd  : No alonzo, deferred   MUSKULOSKELETAL:  trace b/l LE edema, no calf tenderness, wwp, Left left externally rotated, palpable peripheral pulses b/l                                                                   SKIN / BREAST:    WNL                                                          LABS:                        14.8   18.95 )-----------( 261      ( 13 May 2021 19:57 )             44.6     05-13    143  |  104  |  18  ----------------------------<  165<H>  4.0   |  25  |  0.69    Ca    9.3      13 May 2021 19:57    TPro  7.3  /  Alb  3.8  /  TBili  1.4<H>  /  DBili  x   /  AST  52<H>  /  ALT  20  /  AlkPhos  69  05-13    PT/INR - ( 13 May 2021 20:28 )   PT: 11.6 sec;   INR: 0.97          PTT - ( 13 May 2021 20:28 )  PTT:27.3 sec  Urinalysis Basic - ( 13 May 2021 21:45 )    Color: Yellow / Appearance: SL Cloudy / SG: >=1.030 / pH: x  Gluc: x / Ketone: 15 mg/dL  / Bili: Negative / Urobili: 1.0 E.U./dL   Blood: x / Protein: 30 mg/dL / Nitrite: POSITIVE   Leuk Esterase: NEGATIVE / RBC: < 5 /HPF / WBC 5-10 /HPF   Sq Epi: x / Non Sq Epi: 0-5 /HPF / Bacteria: Present /HPF      CARDIAC MARKERS ( 13 May 2021 19:57 )  x     / 0.01 ng/mL / 1197 U/L / x     / 15.4 ng/mL          RADIOLOGY & ADDITIONAL STUDIES:  CAROTID U/S:    CXR:  pending offiical read: rotated, congested     CT Scan:  < from: CT Head No Cont (05.13.21 @ 21:32) >  < from: CT Chest No Cont (05.13.21 @ 21:32) >    Impression:  1.  Severe chronic compression fracture of the L3 vertebral body with mild osseous retropulsion at the L2-3 level which, along with ligamentous hypertrophy, appears to cause moderate to severe canal stenosis. Several linear lucencies within this vertebral body suggest superimposed acute fracture component.  2.  Age-indeterminate mild T10 vertebral body compression fracture.      < end of copied text >      < end of copied text >      < from: CT Cervical Spine No Cont (05.13.21 @ 21:33) >    IMPRESSION:  No acute cervical spine fracture or traumatic spondylolysis.    < end of copied text >    EKG:    TTE / IDALMIS:  < from: TTE Echo Complete w/o Contrast w/ Doppler (05.14.21 @ 08:11) >  CONCLUSIONS:     1. Normal left and right ventricular cavity size and systolic function.   2. Mild symmetric left ventricular hypertrophy.   3. Grade I left ventricular diastolic dysfunction.   4. Mildly dilated left atrium.   5. Severe aortic stenosis, SONYA 0.67 cm2, MG 36 mmHg, PV 4.0 m/s, LVOT/AV 0.26.   6. Mild mitral stenosis, MG 3 mmHg at 64 bpm.   7. Pulmonary hypertension present, pulmonary artery systolic pressure is 39 mmHg.   8. No pericardial effusion.   9. No prior echo isavailable for comparison.    < end of copied text >  ch    Cardiac Cath: Surgeon: Dr. Fry    Requesting Physician: Dr. Nj    HISTORY OF PRESENT ILLNESS (Need 4):   89 year old F, PMHx  PMH AS, HTN, PPM (syncopal episodes), spinal stenosis presented with mechanical fall. She states has inner ear balance issues and felt unsteady for which she fell. Denies LOC. No associated CP, SOB, no prodromal symptoms. Found to have L femoral neck fracture and admitted to orthopedics. Cardiology consulted for preoperative evaluation. Patient denies any active chest pain, no shortness of breath or orthopnea. Denies dyspnea on exertion. States that she walks about 4 blocks with a walker, can walk up 4 stairs in her building. States that she has never had any angiogram. Follows with cardiologist Dr. Jay at [facility]. Patient states she has hx of AS, thinks shes been told its been moderate- last echo 6 mo to a year ago. Echo at [facility] revealed severe AS, NL EF. SHD Dr. Fry consulted for intervention prior to Orthopeadic surgery.     PAST MEDICAL & SURGICAL HISTORY:  No pertinent past medical history        MEDICATIONS  (STANDING):  acetaminophen   Tablet .. 650 milliGRAM(s) Oral every 6 hours  amLODIPine   Tablet 5 milliGRAM(s) Oral daily  atorvastatin 10 milliGRAM(s) Oral at bedtime  cefTRIAXone   IVPB 1000 milliGRAM(s) IV Intermittent every 24 hours  chlorhexidine 2% Cloths 1 Application(s) Topical every 12 hours  lactated ringers. 1000 milliLiter(s) (80 mL/Hr) IV Continuous <Continuous>  povidone iodine 5% Nasal Swab 1 Application(s) Both Nostrils once    MEDICATIONS  (PRN):  traMADol 25 milliGRAM(s) Oral every 6 hours PRN Moderate Pain (4 - 6)  traMADol 50 milliGRAM(s) Oral every 6 hours PRN Severe Pain (7 - 10)      Allergies    amoxicillin (Unknown)  ciprofloxacin (Unknown)  metronidazole (Unknown)    Intolerances        SOCIAL HISTORY:  Smoker: No  ETOH use:No  Ilicit Drug use: no  Occupation: n/a  Assisted device use (Cane / Walker): Walker  Live with: Independent of ADLs    FAMILY HISTORY:      Review of Systems (Need 10):  CONSTITUTIONAL: Denies fevers / chills, sweats, fatigue, weight loss, weight gain                                       NEURO:  + "undsteadiness" Denies parathesias, seizures, syncope, confusion                                                                                  EYES:  Denies blurry vision, discharge, pain, loss of vision                                                                                    ENMT:  Denies difficulty hearing, vertigo, dysphagia, epistaxis, recent dental work                                       CV:  Denies chest pain, palpitations, STEPHENS, orthopnea                                                                                           RESPIRATORY:  Denies wWheezing, SOB, cough / sputum, hemoptysis                                                               GI:  Denies nausea, vomiting, diarrhea, constipation, melena                                                                          : Denies hematuria, dysuria, urgency, incontinence                                                                                          MUSKULOSKELETAL:  Denies arthritis, joint swelling, muscle weakness                                                             SKIN/BREAST:  Denies rash, itching, hair loss, masses                                                                                              PSYCH:  Denies depression, anxiety, suicidal ideation                                                                                                HEME/LYMPH:  Denies bruises easily, enlarged lymph nodes, tender lymph nodes                                          ENDOCRINE:  Denies cold intolerance, heat intolerance, polydipsia                                                                      Vital Signs Last 24 Hrs  T(C): 36.6 (14 May 2021 09:30), Max: 36.9 (13 May 2021 23:55)  T(F): 97.8 (14 May 2021 09:30), Max: 98.4 (13 May 2021 23:55)  HR: 75 (14 May 2021 09:30) (67 - 84)  BP: 138/60 (14 May 2021 09:30) (109/60 - 138/60)  BP(mean): --  RR: 16 (14 May 2021 09:30) (16 - 18)  SpO2: 96% (14 May 2021 09:30) (94% - 96%)    Physical Exam (Need 8)  CONSTITUTIONAL:  Laying comfortably in bed, nad, on NC                                                                          NEURO: AAOx3, no neuro deficits noted, CN grossly intact                 EYES:    WNL  ENMT:    WNL  CV:  S1s2, rrr, +III/VI CHIQUI   RESPIRATORY:   bibasilar crackles   GI:    +Bs, soft, nt/nd  : +alonzo, deferred   MUSKULOSKELETAL:  trace b/l LE edema, no calf tenderness, wwp, Left left externally rotated, palpable peripheral pulses b/l                                                                   SKIN / BREAST:    WNL                                                          LABS:                        14.8   18.95 )-----------( 261      ( 13 May 2021 19:57 )             44.6     05-13    143  |  104  |  18  ----------------------------<  165<H>  4.0   |  25  |  0.69    Ca    9.3      13 May 2021 19:57    TPro  7.3  /  Alb  3.8  /  TBili  1.4<H>  /  DBili  x   /  AST  52<H>  /  ALT  20  /  AlkPhos  69  05-13    PT/INR - ( 13 May 2021 20:28 )   PT: 11.6 sec;   INR: 0.97          PTT - ( 13 May 2021 20:28 )  PTT:27.3 sec  Urinalysis Basic - ( 13 May 2021 21:45 )    Color: Yellow / Appearance: SL Cloudy / SG: >=1.030 / pH: x  Gluc: x / Ketone: 15 mg/dL  / Bili: Negative / Urobili: 1.0 E.U./dL   Blood: x / Protein: 30 mg/dL / Nitrite: POSITIVE   Leuk Esterase: NEGATIVE / RBC: < 5 /HPF / WBC 5-10 /HPF   Sq Epi: x / Non Sq Epi: 0-5 /HPF / Bacteria: Present /HPF      CARDIAC MARKERS ( 13 May 2021 19:57 )  x     / 0.01 ng/mL / 1197 U/L / x     / 15.4 ng/mL          RADIOLOGY & ADDITIONAL STUDIES:  CAROTID U/S:    CXR:  pending offiical read: rotated, congested     CT Scan:  < from: CT Head No Cont (05.13.21 @ 21:32) >  < from: CT Chest No Cont (05.13.21 @ 21:32) >    Impression:  1.  Severe chronic compression fracture of the L3 vertebral body with mild osseous retropulsion at the L2-3 level which, along with ligamentous hypertrophy, appears to cause moderate to severe canal stenosis. Several linear lucencies within this vertebral body suggest superimposed acute fracture component.  2.  Age-indeterminate mild T10 vertebral body compression fracture.      < end of copied text >      < end of copied text >      < from: CT Cervical Spine No Cont (05.13.21 @ 21:33) >    IMPRESSION:  No acute cervical spine fracture or traumatic spondylolysis.    < end of copied text >    EKG:    TTE / IDALMIS:  < from: TTE Echo Complete w/o Contrast w/ Doppler (05.14.21 @ 08:11) >  CONCLUSIONS:     1. Normal left and right ventricular cavity size and systolic function.   2. Mild symmetric left ventricular hypertrophy.   3. Grade I left ventricular diastolic dysfunction.   4. Mildly dilated left atrium.   5. Severe aortic stenosis, SONYA 0.67 cm2, MG 36 mmHg, PV 4.0 m/s, LVOT/AV 0.26.   6. Mild mitral stenosis, MG 3 mmHg at 64 bpm.   7. Pulmonary hypertension present, pulmonary artery systolic pressure is 39 mmHg.   8. No pericardial effusion.   9. No prior echo isavailable for comparison.    < end of copied text >  ch    Cardiac Cath:

## 2021-05-14 NOTE — CONSULT NOTE ADULT - ASSESSMENT
89 year old F, PMHx  PMH AS, HTN, PPM (syncopal episodes), spinal stenosis presented with mechanical fall. She states has inner ear balance issues and felt unsteady for which she fell. Found to have L femoral neck fracture and admitted to orthopedics. Cardiology consulted for preoperative evaluation. Echo at [facility] revealed severe AS, NL EF. SHD Dr. Fry consulted for intervention prior to Orthopeadic surgery.       INCOMPLETE NOTE  Plan:  Problem 1: Severe AS  -Echo/case reviewed by Dr. Fry  -  -      Problem 2: L femoral Neck Fx  -Ortho following- plan is for OR tomorrow with Dr. Nj  -management per primary team      Problem 3: UTI  -abx per primary team       I have reviewed clinical labs tests and reports, radiology tests and reports, as well as old patient medical records, and discussed with the refering physician.      89 year old F, PMHx  PMH AS, HTN, PPM (syncopal episodes), spinal stenosis presented with mechanical fall. She states has inner ear balance issues and felt unsteady for which she fell. Found to have L femoral neck fracture and admitted to orthopedics. Cardiology consulted for preoperative evaluation. Echo at [facility] revealed severe AS, NL EF. SHD Dr. Fry consulted for intervention prior to Orthopeadic surgery.     Plan:  Problem 1: Severe AS  -Echo/case reviewed by Dr. Fry  -Ortho planning hemiarthroplasty, Severe AS found on work up  -Plan for BAV today with Dr. Fry in hybrid OR, Ortho surgery tomorrow  -Patient has been NPO, consent signed and in chart, COVID negative yesterday, active type and screen  -EKG Pending     Problem 2: L femoral Neck Fx  -Ortho following- plan is for OR tomorrow with Dr. Nj  -management per primary team    Problem 3: UTI  -abx per primary team       I have reviewed clinical labs tests and reports, radiology tests and reports, as well as old patient medical records, and discussed with the refering physician.      89 year old F, PMHx  PMH AS, HTN, PPM (syncopal episodes), spinal stenosis presented with mechanical fall. She states has inner ear balance issues and felt unsteady for which she fell. Found to have L femoral neck fracture and admitted to orthopedics. Cardiology consulted for preoperative evaluation. Echo at [facility] revealed severe AS, NL EF. SHD Dr. Fry consulted for intervention prior to Orthopeadic surgery.     Plan:  Problem 1: Severe AS  -Echo/case reviewed by Dr. Fry  -Ortho planning hemiarthroplasty, Severe AS found on work up  -Plan for BAV today with Dr. Fry in hybrid OR, Ortho surgery tomorrow  -Patient has been NPO, consent signed and in chart, COVID negative yesterday, active type and screen  -EKG SR w/ 1st degree AVB, narrow qrs    Problem 2: L femoral Neck Fx  -Ortho following- plan is for OR tomorrow with Dr. Nj  -management per primary team    Problem 3: UTI  -abx per primary team       I have reviewed clinical labs tests and reports, radiology tests and reports, as well as old patient medical records, and discussed with the refering physician.

## 2021-05-14 NOTE — PROGRESS NOTE ADULT - SUBJECTIVE AND OBJECTIVE BOX
SUBJECTIVE: Patient seen and examined. Pain controlled.  No issues overnight. No HA, f/c/n/v/cp/sob.     OBJECTIVE:  NAD  Vital Signs Last 24 Hrs  T(C): 36.1 (14 May 2021 06:25), Max: 36.9 (13 May 2021 23:55)  T(F): 97 (14 May 2021 06:25), Max: 98.4 (13 May 2021 23:55)  HR: 69 (14 May 2021 06:25) (67 - 84)  BP: 132/60 (14 May 2021 06:25) (109/60 - 133/62)  BP(mean): --  RR: 17 (14 May 2021 06:25) (16 - 18)  SpO2: 94% (14 May 2021 06:25) (94% - 96%)    Physical Exam:   General: Resting comfortably, NAD  LLE: Shortened, externally rotated. SILT. TA/EHL/FHL/GS motor intact. Warm well perfused; capillary refill <3 seconds                Labs:             14.8   18.95 )-----------( 261      ( 13 May 2021 19:57 )             44.6     05-13    143  |  104  |  18  ----------------------------<  165<H>  4.0   |  25  |  0.69    Ca    9.3      13 May 2021 19:57    TPro  7.3  /  Alb  3.8  /  TBili  1.4<H>  /  DBili  x   /  AST  52<H>  /  ALT  20  /  AlkPhos  69  05-13      A/P :  Pt is a 89yo Female with L FNF  -  Pain control  -  DVT ppx: SCDs     -  Weight bearing status: WBAT   -  Physical Therapy  -  F/u echo, cardio  -  Dispo: OR today pending clearance

## 2021-05-14 NOTE — PROGRESS NOTE ADULT - ASSESSMENT
89yo F PMH aortic stenosis, HTN, HLD, GERD, spinal stenosis, multiple syncopal episodes s/p PPM, diverticulosis presenting with L hip pain after mechanical fall, found to have L FNF, also found to have severe AS on echo w/ plan for BAV tonight and OR for fracture tomorrow.     #Severe AS: for BAV, maintain euvolemic state, f/u cardiology recs, would d/c IVF  #Preop evaluation: for BAV for severe AS today, f/u cardiology recommendations, OR for L FNF tomorrow  #UTI: mild symptoms, c/w ceftriaxone, f/u urine culture  #Dysphagia: s/s eval after OR  #GERD: start PPI  #Spinal stenosis   #Compression Fx: pain control  #HLD: c/w statin  #HTN: c/w norvasc  #Dispo: BAV for severe AS today, OR for L FNF tomorrow

## 2021-05-14 NOTE — CONSULT NOTE ADULT - ATTENDING COMMENTS
89F PMH AS, HTN, PPM (syncopal episodes), spinal stenosis presented with mechanical fall found to have L femoral neck fracture and admitted to orthopedics consulted for preoperative evaluation.    Patient is a high risk patient planned for an intermediate risk surgery  in light of necessity for the overall prognosis of the individual the patient the patient is planned for BAV  will reassess after procedure

## 2021-05-14 NOTE — PROGRESS NOTE ADULT - SUBJECTIVE AND OBJECTIVE BOX
Ortho Note      Subjective:  Pt comfortable without complaints, pain controlled with current pain medication regimen. Patient NPO for possible surgery   Denies CP, SOB, N/V, numbness/tingling       Vital Signs Last 24 Hrs  T(C): 36.4 (05-14-21 @ 13:47), Max: 36.4 (05-14-21 @ 13:47)  T(F): 97.5 (05-14-21 @ 13:47), Max: 97.5 (05-14-21 @ 13:47)  HR: 75 (05-14-21 @ 13:47) (75 - 75)  BP: 123/60 (05-14-21 @ 13:47) (123/60 - 123/60)  BP(mean): --  RR: 17 (05-14-21 @ 13:47) (17 - 17)  SpO2: 96% (05-14-21 @ 13:47) (96% - 96%)  AVSS      Objective:    Physical Exam:  General: Pt Alert and oriented, NAD  Pulses: +2 intact, wwp toes  Sensation: SILT intact  Motor: EHL/FHL/TA/GS- firing        Plan of Care:  A/P: 89yFemale scheduled for OR today- Left Hip hemiarthroplasty  - afebrile, nontoxic apperance  - Pain Control- tylenol 650mg PO Q6h, tramadol 25-50mg PO Q6h prn moderate to severe pain ,   - DVT ppx: held for OR  - PT, WBS: Bedrest  - NPO for OR, LR @ 110 ml/hour  - Dispo: OR pending cards clearance      Ortho Pager [phone]

## 2021-05-15 ENCOUNTER — TRANSCRIPTION ENCOUNTER (OUTPATIENT)
Age: 86
End: 2021-05-15

## 2021-05-15 LAB
-  AMPICILLIN/SULBACTAM: SIGNIFICANT CHANGE UP
-  AMPICILLIN: SIGNIFICANT CHANGE UP
-  CEFAZOLIN: SIGNIFICANT CHANGE UP
-  CEFTRIAXONE: SIGNIFICANT CHANGE UP
-  CIPROFLOXACIN: SIGNIFICANT CHANGE UP
-  ERTAPENEM: SIGNIFICANT CHANGE UP
-  GENTAMICIN: SIGNIFICANT CHANGE UP
-  NITROFURANTOIN: SIGNIFICANT CHANGE UP
-  PIPERACILLIN/TAZOBACTAM: SIGNIFICANT CHANGE UP
-  TOBRAMYCIN: SIGNIFICANT CHANGE UP
-  TRIMETHOPRIM/SULFAMETHOXAZOLE: SIGNIFICANT CHANGE UP
ANION GAP SERPL CALC-SCNC: 10 MMOL/L — SIGNIFICANT CHANGE UP (ref 5–17)
APTT BLD: 25.9 SEC — LOW (ref 27.5–35.5)
BUN SERPL-MCNC: 13 MG/DL — SIGNIFICANT CHANGE UP (ref 7–23)
CALCIUM SERPL-MCNC: 8 MG/DL — LOW (ref 8.4–10.5)
CHLORIDE SERPL-SCNC: 108 MMOL/L — SIGNIFICANT CHANGE UP (ref 96–108)
CO2 SERPL-SCNC: 24 MMOL/L — SIGNIFICANT CHANGE UP (ref 22–31)
CREAT SERPL-MCNC: 0.63 MG/DL — SIGNIFICANT CHANGE UP (ref 0.5–1.3)
CULTURE RESULTS: SIGNIFICANT CHANGE UP
GAS PNL BLDA: SIGNIFICANT CHANGE UP
GLUCOSE SERPL-MCNC: 103 MG/DL — HIGH (ref 70–99)
HCT VFR BLD CALC: 31.8 % — LOW (ref 34.5–45)
HGB BLD-MCNC: 10.3 G/DL — LOW (ref 11.5–15.5)
INR BLD: 1.01 — SIGNIFICANT CHANGE UP (ref 0.88–1.16)
MAGNESIUM SERPL-MCNC: 2.4 MG/DL — SIGNIFICANT CHANGE UP (ref 1.6–2.6)
MCHC RBC-ENTMCNC: 28.8 PG — SIGNIFICANT CHANGE UP (ref 27–34)
MCHC RBC-ENTMCNC: 32.4 GM/DL — SIGNIFICANT CHANGE UP (ref 32–36)
MCV RBC AUTO: 88.8 FL — SIGNIFICANT CHANGE UP (ref 80–100)
METHOD TYPE: SIGNIFICANT CHANGE UP
NRBC # BLD: 0 /100 WBCS — SIGNIFICANT CHANGE UP (ref 0–0)
ORGANISM # SPEC MICROSCOPIC CNT: SIGNIFICANT CHANGE UP
ORGANISM # SPEC MICROSCOPIC CNT: SIGNIFICANT CHANGE UP
PHOSPHATE SERPL-MCNC: 2 MG/DL — LOW (ref 2.5–4.5)
PLATELET # BLD AUTO: 157 K/UL — SIGNIFICANT CHANGE UP (ref 150–400)
POTASSIUM SERPL-MCNC: 3.8 MMOL/L — SIGNIFICANT CHANGE UP (ref 3.5–5.3)
POTASSIUM SERPL-SCNC: 3.8 MMOL/L — SIGNIFICANT CHANGE UP (ref 3.5–5.3)
PROTHROM AB SERPL-ACNC: 12.1 SEC — SIGNIFICANT CHANGE UP (ref 10.6–13.6)
RBC # BLD: 3.58 M/UL — LOW (ref 3.8–5.2)
RBC # FLD: 14.7 % — HIGH (ref 10.3–14.5)
SODIUM SERPL-SCNC: 142 MMOL/L — SIGNIFICANT CHANGE UP (ref 135–145)
SPECIMEN SOURCE: SIGNIFICANT CHANGE UP
WBC # BLD: 10.73 K/UL — HIGH (ref 3.8–10.5)
WBC # FLD AUTO: 10.73 K/UL — HIGH (ref 3.8–10.5)

## 2021-05-15 PROCEDURE — 71045 X-RAY EXAM CHEST 1 VIEW: CPT | Mod: 26

## 2021-05-15 PROCEDURE — 99233 SBSQ HOSP IP/OBS HIGH 50: CPT | Mod: GC

## 2021-05-15 PROCEDURE — 93308 TTE F-UP OR LMTD: CPT | Mod: 26

## 2021-05-15 PROCEDURE — 99233 SBSQ HOSP IP/OBS HIGH 50: CPT

## 2021-05-15 PROCEDURE — 93010 ELECTROCARDIOGRAM REPORT: CPT

## 2021-05-15 RX ORDER — CHLORHEXIDINE GLUCONATE 213 G/1000ML
1 SOLUTION TOPICAL EVERY 12 HOURS
Refills: 0 | Status: COMPLETED | OUTPATIENT
Start: 2021-05-15 | End: 2021-05-16

## 2021-05-15 RX ORDER — ACETAMINOPHEN 500 MG
1000 TABLET ORAL ONCE
Refills: 0 | Status: COMPLETED | OUTPATIENT
Start: 2021-05-15 | End: 2021-05-15

## 2021-05-15 RX ORDER — SODIUM CHLORIDE 9 MG/ML
1000 INJECTION, SOLUTION INTRAVENOUS
Refills: 0 | Status: DISCONTINUED | OUTPATIENT
Start: 2021-05-15 | End: 2021-05-16

## 2021-05-15 RX ORDER — ACETAMINOPHEN 500 MG
650 TABLET ORAL EVERY 6 HOURS
Refills: 0 | Status: DISCONTINUED | OUTPATIENT
Start: 2021-05-15 | End: 2021-05-15

## 2021-05-15 RX ORDER — ACETAMINOPHEN 500 MG
1000 TABLET ORAL ONCE
Refills: 0 | Status: COMPLETED | OUTPATIENT
Start: 2021-05-16 | End: 2021-05-16

## 2021-05-15 RX ORDER — HEPARIN SODIUM 5000 [USP'U]/ML
5000 INJECTION INTRAVENOUS; SUBCUTANEOUS EVERY 8 HOURS
Refills: 0 | Status: COMPLETED | OUTPATIENT
Start: 2021-05-15 | End: 2021-05-15

## 2021-05-15 RX ORDER — POTASSIUM PHOSPHATE, MONOBASIC POTASSIUM PHOSPHATE, DIBASIC 236; 224 MG/ML; MG/ML
30 INJECTION, SOLUTION INTRAVENOUS ONCE
Refills: 0 | Status: COMPLETED | OUTPATIENT
Start: 2021-05-15 | End: 2021-05-15

## 2021-05-15 RX ORDER — FUROSEMIDE 40 MG
10 TABLET ORAL ONCE
Refills: 0 | Status: COMPLETED | OUTPATIENT
Start: 2021-05-15 | End: 2021-05-15

## 2021-05-15 RX ORDER — HEPARIN SODIUM 5000 [USP'U]/ML
5000 INJECTION INTRAVENOUS; SUBCUTANEOUS EVERY 8 HOURS
Refills: 0 | Status: DISCONTINUED | OUTPATIENT
Start: 2021-05-15 | End: 2021-05-15

## 2021-05-15 RX ORDER — PANTOPRAZOLE SODIUM 20 MG/1
40 TABLET, DELAYED RELEASE ORAL DAILY
Refills: 0 | Status: DISCONTINUED | OUTPATIENT
Start: 2021-05-15 | End: 2021-05-19

## 2021-05-15 RX ORDER — POVIDONE-IODINE 5 %
1 AEROSOL (ML) TOPICAL ONCE
Refills: 0 | Status: DISCONTINUED | OUTPATIENT
Start: 2021-05-15 | End: 2021-05-19

## 2021-05-15 RX ADMIN — Medication 400 MILLIGRAM(S): at 11:12

## 2021-05-15 RX ADMIN — HEPARIN SODIUM 5000 UNIT(S): 5000 INJECTION INTRAVENOUS; SUBCUTANEOUS at 14:48

## 2021-05-15 RX ADMIN — Medication 1000 MILLIGRAM(S): at 19:41

## 2021-05-15 RX ADMIN — PANTOPRAZOLE SODIUM 40 MILLIGRAM(S): 20 TABLET, DELAYED RELEASE ORAL at 11:12

## 2021-05-15 RX ADMIN — POTASSIUM PHOSPHATE, MONOBASIC POTASSIUM PHOSPHATE, DIBASIC 83.33 MILLIMOLE(S): 236; 224 INJECTION, SOLUTION INTRAVENOUS at 05:56

## 2021-05-15 RX ADMIN — Medication 1000 MILLIGRAM(S): at 12:23

## 2021-05-15 RX ADMIN — Medication 10 MILLIGRAM(S): at 15:49

## 2021-05-15 RX ADMIN — CEFTRIAXONE 100 MILLIGRAM(S): 500 INJECTION, POWDER, FOR SOLUTION INTRAMUSCULAR; INTRAVENOUS at 21:47

## 2021-05-15 RX ADMIN — SODIUM CHLORIDE 50 MILLILITER(S): 9 INJECTION, SOLUTION INTRAVENOUS at 13:24

## 2021-05-15 RX ADMIN — Medication 10 MILLIGRAM(S): at 10:59

## 2021-05-15 RX ADMIN — CHLORHEXIDINE GLUCONATE 1 APPLICATION(S): 213 SOLUTION TOPICAL at 21:47

## 2021-05-15 RX ADMIN — Medication 400 MILLIGRAM(S): at 18:32

## 2021-05-15 RX ADMIN — HEPARIN SODIUM 5000 UNIT(S): 5000 INJECTION INTRAVENOUS; SUBCUTANEOUS at 21:47

## 2021-05-15 NOTE — PROGRESS NOTE ADULT - SUBJECTIVE AND OBJECTIVE BOX
Patient is a 89y old  Female who presents with a chief complaint of     INTERVAL HPI/OVERNIGHT EVENTS:  Patient was seen and examined at bedside. As per nurse and patient, no o/n events, patient resting comfortably. Endorses L hip pain, otherwise has no complaints. Hopeful for hip surgery today. Patient denies: fever, chills, dizziness, weakness, HA, Changes in vision, CP, palpitations, cough, LE edema.      PAST MEDICAL & SURGICAL HISTORY:  No pertinent past medical history        T(C): 37 (05-15-21 @ 09:34), Max: 37.3 (05-15-21 @ 01:05)  HR: 75 (05-15-21 @ 12:00) (67 - 91)  BP: 145/59 (05-15-21 @ 12:00) (116/55 - 145/59)  RR: 16 (05-15-21 @ 12:00) (12 - 18)  SpO2: 96% (05-15-21 @ 12:00) (90% - 99%)  Wt(kg): --  I&O's Summary    14 May 2021 07:01  -  15 May 2021 07:00  --------------------------------------------------------  IN: 1100 mL / OUT: 875 mL / NET: 225 mL    15 May 2021 07:01  -  15 May 2021 12:51  --------------------------------------------------------  IN: 525 mL / OUT: 485 mL / NET: 40 mL        PHYSICAL EXAM:  GENERAL: NAD, laying comfortably in bed  HEAD:  Atraumatic, Normocephalic  EYES: EOMI, PERRLA, conjunctiva and sclera clear  ENMT: No tonsillar erythema, exudates, or enlargement; MMM  NECK: Supple, No JVD  NERVOUS SYSTEM:  Alert & Oriented X3, no focal deficits   CHEST/LUNG: Clear to percussion bilaterally; No rales, rhonchi, wheezing, or rubs  HEART: Regular rate and rhythm; +systolic murmur  ABDOMEN: Soft, Nontender, Nondistended; Bowel sounds present  EXTREMITIES:  L hip tender to palpation, no LE edema, + fem pulse  LYMPH: No lymphadenopathy noted  SKIN: No rashes or lesions        LABS:                        10.3   10.73 )-----------( 157      ( 15 May 2021 03:39 )             31.8     05-15    142  |  108  |  13  ----------------------------<  103<H>  3.8   |  24  |  0.63    Ca    8.0<L>      15 May 2021 03:39  Phos  2.0     05-15  Mg     2.4     05-15    TPro  7.3  /  Alb  3.8  /  TBili  1.4<H>  /  DBili  x   /  AST  52<H>  /  ALT  20  /  AlkPhos  69  05-13    PT/INR - ( 15 May 2021 03:39 )   PT: 12.1 sec;   INR: 1.01          PTT - ( 15 May 2021 03:39 )  PTT:25.9 sec  Urinalysis Basic - ( 13 May 2021 21:45 )    Color: Yellow / Appearance: SL Cloudy / SG: >=1.030 / pH: x  Gluc: x / Ketone: 15 mg/dL  / Bili: Negative / Urobili: 1.0 E.U./dL   Blood: x / Protein: 30 mg/dL / Nitrite: POSITIVE   Leuk Esterase: NEGATIVE / RBC: < 5 /HPF / WBC 5-10 /HPF   Sq Epi: x / Non Sq Epi: 0-5 /HPF / Bacteria: Present /HPF      CAPILLARY BLOOD GLUCOSE        ABG - ( 15 May 2021 03:49 )  pH, Arterial: 7.44  pH, Blood: x     /  pCO2: 36    /  pO2: 90    / HCO3: 24    / Base Excess: 0.6   /  SaO2: 99.5                Urinalysis Basic - ( 13 May 2021 21:45 )    Color: Yellow / Appearance: SL Cloudy / SG: >=1.030 / pH: x  Gluc: x / Ketone: 15 mg/dL  / Bili: Negative / Urobili: 1.0 E.U./dL   Blood: x / Protein: 30 mg/dL / Nitrite: POSITIVE   Leuk Esterase: NEGATIVE / RBC: < 5 /HPF / WBC 5-10 /HPF   Sq Epi: x / Non Sq Epi: 0-5 /HPF / Bacteria: Present /HPF        MEDICATIONS  (STANDING):  acetaminophen   Tablet .. 650 milliGRAM(s) Oral every 6 hours  atorvastatin 10 milliGRAM(s) Oral at bedtime  cefTRIAXone   IVPB 1000 milliGRAM(s) IV Intermittent every 24 hours  heparin   Injectable 5000 Unit(s) SubCutaneous every 8 hours  lactated ringers. 1000 milliLiter(s) (50 mL/Hr) IV Continuous <Continuous>  pantoprazole  Injectable 40 milliGRAM(s) IV Push daily    MEDICATIONS  (PRN):      RADIOLOGY & ADDITIONAL TESTS:    Imaging Personally Reviewed:  [ ] YES  [ ] NO    Consultant(s) Notes Reviewed:  [ ] YES  [ ] NO    Care Discussed with Consultants/Other Providers [ ] YES  [ ] NO

## 2021-05-15 NOTE — PROGRESS NOTE ADULT - SUBJECTIVE AND OBJECTIVE BOX
Subjective:  - No complaints this morning  - Events/Chart from overnight reviewed    PAST MEDICAL & SURGICAL HISTORY:  No pertinent past medical history        Vital Signs Last 24 Hrs  T(C): 37.1 (15 May 2021 05:17), Max: 37.3 (15 May 2021 01:05)  T(F): 98.7 (15 May 2021 05:17), Max: 99.1 (15 May 2021 01:05)  HR: 75 (15 May 2021 08:00) (67 - 91)  BP: 116/55 (14 May 2021 18:45) (116/55 - 138/60)  BP(mean): 79 (14 May 2021 18:45) (79 - 79)  RR: 18 (15 May 2021 08:00) (12 - 18)  SpO2: 95% (15 May 2021 08:00) (90% - 99%)   I&O's Detail    14 May 2021 07:01  -  15 May 2021 07:00  --------------------------------------------------------  IN:    IV PiggyBack: 200 mL    IV PiggyBack: 170 mL    Lactated Ringers: 480 mL    Sodium Chloride 0.9% Bolus: 250 mL  Total IN: 1100 mL    OUT:    Indwelling Catheter - Urethral (mL): 875 mL  Total OUT: 875 mL    Total NET: 225 mL      15 May 2021 07:01  -  15 May 2021 08:06  --------------------------------------------------------  IN:    IV PiggyBack: 85 mL  Total IN: 85 mL    OUT:    Indwelling Catheter - Urethral (mL): 45 mL  Total OUT: 45 mL    Total NET: 40 mL        Daily     Daily     Physical Exam:   GEN: NAD, AAOx3  HEENT: MMM, no icterus  CV: S1 S2 RRR, +CHIQUI  Lung: CTAB  Abd: soft NT ND +BS  Ext: no c/c/e, no groin hematoma  Neuro: no focal neuro deficit    MEDICATIONS  (STANDING):  acetaminophen   Tablet .. 650 milliGRAM(s) Oral every 6 hours  atorvastatin 10 milliGRAM(s) Oral at bedtime  cefTRIAXone   IVPB 1000 milliGRAM(s) IV Intermittent every 24 hours      LABS:                        10.3   10.73 )-----------( 157      ( 15 May 2021 03:39 )             31.8     05-15    142  |  108  |  13  ----------------------------<  103<H>  3.8   |  24  |  0.63    Ca    8.0<L>      15 May 2021 03:39  Phos  2.0     05-15  Mg     2.4     05-15    TPro  7.3  /  Alb  3.8  /  TBili  1.4<H>  /  DBili  x   /  AST  52<H>  /  ALT  20  /  AlkPhos  69  05-13    CARDIAC MARKERS ( 13 May 2021 19:57 )  x     / 0.01 ng/mL / 1197 U/L / x     / 15.4 ng/mL      PT/INR - ( 15 May 2021 03:39 )   PT: 12.1 sec;   INR: 1.01          PTT - ( 15 May 2021 03:39 )  PTT:25.9 sec    EKG demonstrates NSR w/ 1st degree avb,

## 2021-05-15 NOTE — PROGRESS NOTE ADULT - ASSESSMENT
89yo F PMH aortic stenosis, HTN, HLD, GERD, spinal stenosis, multiple syncopal episodes s/p PPM, diverticulosis presenting with L hip pain after mechanical fall, found to have L FNF, also found to have severe AS on echo s/p BAV and awaiting OR for fracture    #Severe AS: s/p BAV, maintain euvolemic state, f/u cardiology recs  #Preop evaluation: s/p BAV for severe AS f/u cardiology recommendations- rec repeat TTE, can proceed to OR without further cardiac workup, OR for L FNF  #UTI: mild symptoms, c/w ceftriaxone, f/u urine culture  #Dysphagia: s/s eval after OR  #GERD: cw PPI  #Spinal stenosis   #Compression Fx: pain control  #HLD: c/w statin  #HTN: c/w norvasc  #Dispo: OR for L FNF

## 2021-05-15 NOTE — PROGRESS NOTE ADULT - SUBJECTIVE AND OBJECTIVE BOX
Subjective:  Pt comfortable without complaints, pain controlled with current pain medication regimen. Patient NPO for possible surgery   Denies CP, SOB, N/V, numbness/tingling       Vital Signs Last 24 Hrs  T(C): 37.1 (05-15-21 @ 05:17), Max: 37.3 (05-15-21 @ 01:05)  T(F): 98.7 (05-15-21 @ 05:17), Max: 99.1 (05-15-21 @ 01:05)  HR: 91 (05-15-21 @ 05:00) (68 - 91)  BP: --  BP(mean): --  RR: 18 (05-15-21 @ 05:00) (14 - 18)  SpO2: 95% (05-15-21 @ 05:00) (93% - 95%)      Objective:    Physical Exam:  General: Pt Alert and oriented, NAD  Pulses: +2 intact, wwp toes  Sensation: SILT intact  Motor: EHL/FHL/TA/GS- firing        Plan of Care:  A/P: 89yF w L hip fx, found to have severe aortic stenosis now s/p BAV w cards stable, pending clearance for arthroplasty pt would benefit step down to medicine secondary to significant comorbid conditions for adequate management prior and after orthopaedic intervention  - afebrile, nontoxic apperance  - Pain Control- tylenol 650mg PO Q6h, tramadol 25-50mg PO Q6h prn moderate to severe pain ,   - DVT ppx: held for OR  - PT, WBS: Bedrest  - Dispo: OR pending cards clearance      Ortho Pager [phone]

## 2021-05-15 NOTE — PROGRESS NOTE ADULT - ASSESSMENT
89 year old F, PMHx  PMH AS, HTN, PPM (syncopal episodes), spinal stenosis presented with fall/hip fracture found to have severe AS and s/p BAV.  -stable at this point  -BP stable on norvasc, holding losartan  -nonobstructive CAD on cath yesterday and BAV with improved gradients  -stable for ortho surgery  -DVT ppx   -dispo to ortho for OR   -repeat TTE today

## 2021-05-16 LAB
ANION GAP SERPL CALC-SCNC: 14 MMOL/L — SIGNIFICANT CHANGE UP (ref 5–17)
BUN SERPL-MCNC: 12 MG/DL — SIGNIFICANT CHANGE UP (ref 7–23)
CALCIUM SERPL-MCNC: 8 MG/DL — LOW (ref 8.4–10.5)
CHLORIDE SERPL-SCNC: 103 MMOL/L — SIGNIFICANT CHANGE UP (ref 96–108)
CO2 SERPL-SCNC: 22 MMOL/L — SIGNIFICANT CHANGE UP (ref 22–31)
CREAT SERPL-MCNC: 0.58 MG/DL — SIGNIFICANT CHANGE UP (ref 0.5–1.3)
GLUCOSE SERPL-MCNC: 74 MG/DL — SIGNIFICANT CHANGE UP (ref 70–99)
HCT VFR BLD CALC: 32.7 % — LOW (ref 34.5–45)
HCT VFR BLD CALC: 33.7 % — LOW (ref 34.5–45)
HGB BLD-MCNC: 10.5 G/DL — LOW (ref 11.5–15.5)
HGB BLD-MCNC: 10.8 G/DL — LOW (ref 11.5–15.5)
MCHC RBC-ENTMCNC: 28.7 PG — SIGNIFICANT CHANGE UP (ref 27–34)
MCHC RBC-ENTMCNC: 29.1 PG — SIGNIFICANT CHANGE UP (ref 27–34)
MCHC RBC-ENTMCNC: 32 GM/DL — SIGNIFICANT CHANGE UP (ref 32–36)
MCHC RBC-ENTMCNC: 32.1 GM/DL — SIGNIFICANT CHANGE UP (ref 32–36)
MCV RBC AUTO: 89.3 FL — SIGNIFICANT CHANGE UP (ref 80–100)
MCV RBC AUTO: 90.8 FL — SIGNIFICANT CHANGE UP (ref 80–100)
NRBC # BLD: 0 /100 WBCS — SIGNIFICANT CHANGE UP (ref 0–0)
NRBC # BLD: 0 /100 WBCS — SIGNIFICANT CHANGE UP (ref 0–0)
PLATELET # BLD AUTO: 150 K/UL — SIGNIFICANT CHANGE UP (ref 150–400)
PLATELET # BLD AUTO: 191 K/UL — SIGNIFICANT CHANGE UP (ref 150–400)
POTASSIUM SERPL-MCNC: 3.6 MMOL/L — SIGNIFICANT CHANGE UP (ref 3.5–5.3)
POTASSIUM SERPL-SCNC: 3.6 MMOL/L — SIGNIFICANT CHANGE UP (ref 3.5–5.3)
RBC # BLD: 3.66 M/UL — LOW (ref 3.8–5.2)
RBC # BLD: 3.71 M/UL — LOW (ref 3.8–5.2)
RBC # FLD: 14.7 % — HIGH (ref 10.3–14.5)
RBC # FLD: 14.9 % — HIGH (ref 10.3–14.5)
SODIUM SERPL-SCNC: 139 MMOL/L — SIGNIFICANT CHANGE UP (ref 135–145)
WBC # BLD: 11.9 K/UL — HIGH (ref 3.8–10.5)
WBC # BLD: 14.22 K/UL — HIGH (ref 3.8–10.5)
WBC # FLD AUTO: 11.9 K/UL — HIGH (ref 3.8–10.5)
WBC # FLD AUTO: 14.22 K/UL — HIGH (ref 3.8–10.5)

## 2021-05-16 PROCEDURE — 99232 SBSQ HOSP IP/OBS MODERATE 35: CPT

## 2021-05-16 PROCEDURE — 99232 SBSQ HOSP IP/OBS MODERATE 35: CPT | Mod: GC

## 2021-05-16 PROCEDURE — 99233 SBSQ HOSP IP/OBS HIGH 50: CPT

## 2021-05-16 PROCEDURE — 72170 X-RAY EXAM OF PELVIS: CPT | Mod: 26

## 2021-05-16 RX ORDER — ACETAMINOPHEN 500 MG
1000 TABLET ORAL ONCE
Refills: 0 | Status: COMPLETED | OUTPATIENT
Start: 2021-05-16 | End: 2021-05-16

## 2021-05-16 RX ORDER — TRAMADOL HYDROCHLORIDE 50 MG/1
50 TABLET ORAL EVERY 4 HOURS
Refills: 0 | Status: DISCONTINUED | OUTPATIENT
Start: 2021-05-16 | End: 2021-05-19

## 2021-05-16 RX ORDER — HYDRALAZINE HCL 50 MG
10 TABLET ORAL ONCE
Refills: 0 | Status: COMPLETED | OUTPATIENT
Start: 2021-05-16 | End: 2021-05-16

## 2021-05-16 RX ORDER — TRAMADOL HYDROCHLORIDE 50 MG/1
25 TABLET ORAL EVERY 6 HOURS
Refills: 0 | Status: DISCONTINUED | OUTPATIENT
Start: 2021-05-16 | End: 2021-05-19

## 2021-05-16 RX ORDER — HYDROMORPHONE HYDROCHLORIDE 2 MG/ML
0.5 INJECTION INTRAMUSCULAR; INTRAVENOUS; SUBCUTANEOUS ONCE
Refills: 0 | Status: DISCONTINUED | OUTPATIENT
Start: 2021-05-16 | End: 2021-05-19

## 2021-05-16 RX ORDER — CEFAZOLIN SODIUM 1 G
2000 VIAL (EA) INJECTION EVERY 8 HOURS
Refills: 0 | Status: COMPLETED | OUTPATIENT
Start: 2021-05-16 | End: 2021-05-17

## 2021-05-16 RX ORDER — SENNA PLUS 8.6 MG/1
2 TABLET ORAL AT BEDTIME
Refills: 0 | Status: DISCONTINUED | OUTPATIENT
Start: 2021-05-16 | End: 2021-05-19

## 2021-05-16 RX ORDER — SODIUM CHLORIDE 9 MG/ML
1000 INJECTION, SOLUTION INTRAVENOUS
Refills: 0 | Status: DISCONTINUED | OUTPATIENT
Start: 2021-05-16 | End: 2021-05-19

## 2021-05-16 RX ORDER — ACETAMINOPHEN 500 MG
650 TABLET ORAL EVERY 6 HOURS
Refills: 0 | Status: DISCONTINUED | OUTPATIENT
Start: 2021-05-16 | End: 2021-05-16

## 2021-05-16 RX ORDER — ACETAMINOPHEN 500 MG
1000 TABLET ORAL EVERY 8 HOURS
Refills: 0 | Status: DISCONTINUED | OUTPATIENT
Start: 2021-05-16 | End: 2021-05-19

## 2021-05-16 RX ORDER — BUPIVACAINE 13.3 MG/ML
20 INJECTION, SUSPENSION, LIPOSOMAL INFILTRATION ONCE
Refills: 0 | Status: DISCONTINUED | OUTPATIENT
Start: 2021-05-16 | End: 2021-05-19

## 2021-05-16 RX ORDER — ONDANSETRON 8 MG/1
4 TABLET, FILM COATED ORAL EVERY 6 HOURS
Refills: 0 | Status: DISCONTINUED | OUTPATIENT
Start: 2021-05-16 | End: 2021-05-19

## 2021-05-16 RX ORDER — MAGNESIUM HYDROXIDE 400 MG/1
30 TABLET, CHEWABLE ORAL DAILY
Refills: 0 | Status: DISCONTINUED | OUTPATIENT
Start: 2021-05-16 | End: 2021-05-19

## 2021-05-16 RX ORDER — CEFAZOLIN SODIUM 1 G
2000 VIAL (EA) INJECTION EVERY 8 HOURS
Refills: 0 | Status: DISCONTINUED | OUTPATIENT
Start: 2021-05-16 | End: 2021-05-16

## 2021-05-16 RX ORDER — ASPIRIN/CALCIUM CARB/MAGNESIUM 324 MG
325 TABLET ORAL
Refills: 0 | Status: DISCONTINUED | OUTPATIENT
Start: 2021-05-16 | End: 2021-05-19

## 2021-05-16 RX ORDER — POLYETHYLENE GLYCOL 3350 17 G/17G
17 POWDER, FOR SOLUTION ORAL AT BEDTIME
Refills: 0 | Status: DISCONTINUED | OUTPATIENT
Start: 2021-05-16 | End: 2021-05-19

## 2021-05-16 RX ORDER — TRAMADOL HYDROCHLORIDE 50 MG/1
50 TABLET ORAL EVERY 6 HOURS
Refills: 0 | Status: DISCONTINUED | OUTPATIENT
Start: 2021-05-16 | End: 2021-05-16

## 2021-05-16 RX ADMIN — PANTOPRAZOLE SODIUM 40 MILLIGRAM(S): 20 TABLET, DELAYED RELEASE ORAL at 11:56

## 2021-05-16 RX ADMIN — Medication 1000 MILLIGRAM(S): at 23:40

## 2021-05-16 RX ADMIN — Medication 400 MILLIGRAM(S): at 22:46

## 2021-05-16 RX ADMIN — Medication 10 MILLIGRAM(S): at 01:02

## 2021-05-16 RX ADMIN — CHLORHEXIDINE GLUCONATE 1 APPLICATION(S): 213 SOLUTION TOPICAL at 06:00

## 2021-05-16 RX ADMIN — Medication 1000 MILLIGRAM(S): at 07:15

## 2021-05-16 RX ADMIN — Medication 400 MILLIGRAM(S): at 06:00

## 2021-05-16 RX ADMIN — Medication 400 MILLIGRAM(S): at 02:41

## 2021-05-16 RX ADMIN — Medication 2000 MILLIGRAM(S): at 22:46

## 2021-05-16 NOTE — PROGRESS NOTE ADULT - SUBJECTIVE AND OBJECTIVE BOX
Patient is a 89y old  Female who presents with a chief complaint of Hip fracture (16 May 2021 07:41)      INTERVAL HPI/OVERNIGHT EVENTS:  Patient was seen and examined at bedside. As per nurse and patient, no o/n events, patient resting comfortably. still with pain in hip however slightly improved, denies any SOB and was able to lay flat without SOB. Ready for surgery today. Patient denies: fever, chills, dizziness, weakness, HA, Changes in vision, CP, palpitations, cough, dysuria, LE edema.      PAST MEDICAL & SURGICAL HISTORY:  No pertinent past medical history      T(C): 36.8 (05-16-21 @ 09:12), Max: 37.1 (05-15-21 @ 21:25)  HR: 82 (05-16-21 @ 12:00) (67 - 88)  BP: 149/66 (05-16-21 @ 09:07) (125/57 - 163/67)  RR: 18 (05-16-21 @ 12:00) (16 - 18)  SpO2: 97% (05-16-21 @ 12:00) (91% - 97%)  Wt(kg): --  I&O's Summary    15 May 2021 07:01  -  16 May 2021 07:00  --------------------------------------------------------  IN: 1825 mL / OUT: 1305 mL / NET: 520 mL    16 May 2021 07:01  -  16 May 2021 13:54  --------------------------------------------------------  IN: 250 mL / OUT: 135 mL / NET: 115 mL        PHYSICAL EXAM:  GENERAL: NAD, laying comfortably in bed  HEAD:  Atraumatic, Normocephalic  EYES: EOMI, PERRLA, conjunctiva and sclera clear  ENMT: No tonsillar erythema, exudates, or enlargement; MMM  NECK: Supple, No JVD  NERVOUS SYSTEM:  Alert & Oriented X3, no focal deficits   CHEST/LUNG: Clear to percussion bilaterally; No rales, rhonchi, wheezing, or rubs  HEART: Regular rate and rhythm; +systolic murmur  ABDOMEN: Soft, Nontender, Nondistended; Bowel sounds present  EXTREMITIES:  L hip tender to palpation, no LE edema, + fem pulse  LYMPH: No lymphadenopathy noted  SKIN: No rashes or lesions        LABS:                        10.5   11.90 )-----------( 150      ( 16 May 2021 06:01 )             32.7     05-16    139  |  103  |  12  ----------------------------<  74  3.6   |  22  |  0.58    Ca    8.0<L>      16 May 2021 06:01  Phos  2.0     05-15  Mg     2.4     05-15      PT/INR - ( 15 May 2021 03:39 )   PT: 12.1 sec;   INR: 1.01          PTT - ( 15 May 2021 03:39 )  PTT:25.9 sec    CAPILLARY BLOOD GLUCOSE        ABG - ( 15 May 2021 03:49 )  pH, Arterial: 7.44  pH, Blood: x     /  pCO2: 36    /  pO2: 90    / HCO3: 24    / Base Excess: 0.6   /  SaO2: 99.5                    MEDICATIONS  (STANDING):  atorvastatin 10 milliGRAM(s) Oral at bedtime  lactated ringers. 1000 milliLiter(s) (50 mL/Hr) IV Continuous <Continuous>  pantoprazole  Injectable 40 milliGRAM(s) IV Push daily  povidone iodine 5% Nasal Swab 1 Application(s) Both Nostrils once    MEDICATIONS  (PRN):  bisacodyl Suppository 10 milliGRAM(s) Rectal daily PRN Constipation      RADIOLOGY & ADDITIONAL TESTS:    Imaging Personally Reviewed:  [ ] YES  [ ] NO    Consultant(s) Notes Reviewed:  [ ] YES  [ ] NO    Care Discussed with Consultants/Other Providers [ ] YES  [ ] NO

## 2021-05-16 NOTE — PROGRESS NOTE ADULT - ASSESSMENT
89 year old F, PMHx  PMH AS, HTN, PPM (syncopal episodes), spinal stenosis presented with fall/hip fracture found to have severe AS and s/p BAV.  -gradients improved  -Bp stable on norvasc  -coronaries ok on cath  -DVT ppx on board  -Ceftriaxone for UTI (mildly symptomatic)  -Dysphagia evaluation as per speech and swallow team  -Dispo to OR today for L FNF

## 2021-05-16 NOTE — BRIEF OPERATIVE NOTE - OPERATION/FINDINGS
L hip ann for L FNF
right femoral arterial access with 8 Fr, closed with perc close x2  right femoral venous sheath 6 F left in place

## 2021-05-16 NOTE — PROGRESS NOTE ADULT - ASSESSMENT
ASSESSMENT:  89F PMH AS, HTN, PPM (syncopal episodes), spinal stenosis presented with mechanical fall and found to have L femoral neck fracture pending L hip hemiarthroplasty w/ ortho. Cardiology consulted for preoperative evaluation. Found to have severe AS went for BAV prior to L hip hemiarthroplasty, tolerated BAV well with improvement in gradients. Pending L hip hemiarthroplasty.     PLAN:  #Pre-operative Evaluation  No active chest pain. No SOB. No orthopnea. EKG demonstrates NSR w/ 1st degree avb, LVH based on david criteria  RCRI: 0/class I, 3.9% for MI, death or cardiac arrest at 30 days  Lima: 0.6% for MI or cardiac arrest at 30 days  NSQIP: 0.8% for cardiac complication  METS<4   ASSESSMENT:  89F PMH AS, HTN, PPM (syncopal episodes), spinal stenosis presented with mechanical fall and found to have L femoral neck fracture pending L hip hemiarthroplasty w/ ortho. Cardiology consulted for preoperative evaluation. Found to have severe AS went for BAV prior to L hip hemiarthroplasty, tolerated BAV well with improvement in gradients. Pending L hip hemiarthroplasty.     PLAN:  #Pre-operative Evaluation  No active chest pain. No SOB. No orthopnea. EKG demonstrates NSR w/ 1st degree avb, LVH based on david criteria  RCRI: 0/class I, 3.9% for MI, death or cardiac arrest at 30 days  [place]ima: 0.6% for MI or cardiac arrest at 30 days  NSQIP: 0.8% for cardiac complication  METS<4  Found to have severe AS on echo and s/p BAV for bridging for OR.  Repeat echo: moderate aortic stenosis, peak transvalvular velocity is 2.77 m/s, mean transvalvular gradient is 20 mmHg, and the LVOT/AV velocity ratio is 0.36. The peak transaortic gradient is 31 mmHg. The aortic valve area (estimated via the continuity method) is 1.0 cm², mild AI (previous echo demonstrated peak velocity 4 m/s and mean gradient 36mmhg w/ SONYA 0.67)  - No further cardiac work up necessary  - Patient remains high risk for intermediate risk proceure      Discussed with consult attending, Dr. Whitney.

## 2021-05-16 NOTE — PROVIDER CONTACT NOTE (OTHER) - ASSESSMENT
Pt failed dysphagia screening test after sipping less than 5cc of water. Pt was coughing afterward. Asked for pt to be NPO and asked if patient can be given Tylenol IV for pain.

## 2021-05-16 NOTE — PROGRESS NOTE ADULT - SUBJECTIVE AND OBJECTIVE BOX
Cardiology Consult    O/N: EVER  Interval History: No complaints currently. Pain controlled in hip. R groin stable. No chest pain or shortness of breath. Patient currently laying flat w/out SOB.  Telemetry: NSR    OBJECTIVE  Vitals:  T(C): 36.8 (05-16-21 @ 09:12), Max: 37.1 (05-15-21 @ 21:25)  HR: 88 (05-16-21 @ 09:07) (64 - 88)  BP: 149/66 (05-16-21 @ 09:07) (125/57 - 163/67)  RR: 18 (05-16-21 @ 09:07) (16 - 18)  SpO2: 96% (05-16-21 @ 09:07) (91% - 97%)  Wt(kg): --    I/O:  I&O's Summary    15 May 2021 07:01  -  16 May 2021 07:00  --------------------------------------------------------  IN: 1825 mL / OUT: 1305 mL / NET: 520 mL    16 May 2021 07:01  -  16 May 2021 10:44  --------------------------------------------------------  IN: 50 mL / OUT: 0 mL / NET: 50 mL        PHYSICAL EXAM:  Appearance: NAD. Speaking in full sentences.   HEENT: No JVD  Cardiovascular: RRR, 3/6 Systolic murmur at RSB  Respiratory: Lungs CTAB. .	  Extremities: No edema b/l. No erythema b/l. LE WWP b/l.DP intact  Neurologic:  Alert and awake. Moving all extremities. Following commands.   	  LABS:                        10.5   11.90 )-----------( 150      ( 16 May 2021 06:01 )             32.7     05-16    139  |  103  |  12  ----------------------------<  74  3.6   |  22  |  0.58    Ca    8.0<L>      16 May 2021 06:01  Phos  2.0     05-15  Mg     2.4     05-15      PT/INR - ( 15 May 2021 03:39 )   PT: 12.1 sec;   INR: 1.01          PTT - ( 15 May 2021 03:39 )  PTT:25.9 sec      RADIOLOGY & ADDITIONAL TESTS:  Reviewed .    MEDICATIONS  (STANDING):  atorvastatin 10 milliGRAM(s) Oral at bedtime  lactated ringers. 1000 milliLiter(s) (50 mL/Hr) IV Continuous <Continuous>  pantoprazole  Injectable 40 milliGRAM(s) IV Push daily  povidone iodine 5% Nasal Swab 1 Application(s) Both Nostrils once    MEDICATIONS  (PRN):  bisacodyl Suppository 10 milliGRAM(s) Rectal daily PRN Constipation

## 2021-05-16 NOTE — PROGRESS NOTE ADULT - SUBJECTIVE AND OBJECTIVE BOX
Subjective:  Pt comfortable without complaints, pain controlled with current pain medication regimen. Patient NPO for surgery   Denies CP, SOB, N/V, numbness/tingling       Vital Signs Last 24 Hrs  T(C): 36.8 (16 May 2021 05:01), Max: 37.1 (15 May 2021 21:25)  T(F): 98.3 (16 May 2021 05:01), Max: 98.8 (15 May 2021 21:25)  HR: 78 (16 May 2021 05:00) (64 - 82)  BP: 132/60 (16 May 2021 05:00) (119/53 - 163/67)  BP(mean): 87 (16 May 2021 05:00) (77 - 105)  RR: 18 (16 May 2021 05:00) (16 - 18)  SpO2: 96% (16 May 2021 05:00) (91% - 97%)      Objective:    Physical Exam:  General: Pt comfortable  Pulses: +2 intact, wwp toes  Sensation: SILT intact  Motor: EHL/FHL/TA/GS- firing        Plan of Care:  A/P: 89yF w L hip fx, found to have severe aortic stenosis now s/p BAV w cards stable, cleared for arthroplasty pt would benefit step down to medicine secondary to significant comorbid conditions for adequate management prior and after orthopaedic intervention  - afebrile, nontoxic apperance  - Pain Control- tylenol 650mg PO Q6h, tramadol 25-50mg PO Q6h prn moderate to severe pain ,   - DVT ppx: held for OR  - PT, WBS: Bedrest  - Dispo: OR today      Ortho Pager [phone]

## 2021-05-16 NOTE — PROGRESS NOTE ADULT - SUBJECTIVE AND OBJECTIVE BOX
Ortho Post Op Check    Procedure: L hip ann  Surgeon: Dr. Nj    Pt comfortable without complaints, pain controlled  Denies CP, SOB, N/V, numbness/tingling     Vital Signs Last 24 Hrs  T(C): 36.4 (05-16-21 @ 16:05), Max: 36.4 (05-16-21 @ 16:05)  T(F): 97.5 (05-16-21 @ 16:05), Max: 97.5 (05-16-21 @ 16:05)  HR: 63 (05-16-21 @ 17:51) (60 - 68)  BP: 132/53 (05-16-21 @ 17:51) (113/57 - 139/57)  BP(mean): 76 (05-16-21 @ 17:51) (74 - 82)  RR: 14 (05-16-21 @ 17:51) (14 - 18)  SpO2: 98% (05-16-21 @ 17:51) (93% - 100%)      General: Pt Alert and oriented, NAD  DSG C/D/I  Pulses: palpable DP  Sensation: SILT  Motor: firing EHL/FHL/TA/GS        Post-op X-Ray:    A/P: 89yFemale POD#0 s/p   - Stable  - Pain Control  - DVT ppx: asa  - Post op abx: ancef  - PT, WBS: WBAT    Ortho Pager [phone]

## 2021-05-16 NOTE — PROGRESS NOTE ADULT - SUBJECTIVE AND OBJECTIVE BOX
Subjective:  - No complaints this morning  - Events/Chart from overnight reviewed    PAST MEDICAL & SURGICAL HISTORY:  No pertinent past medical history        Vital Signs Last 24 Hrs  T(C): 36.8 (16 May 2021 05:01), Max: 37.1 (15 May 2021 21:25)  T(F): 98.3 (16 May 2021 05:01), Max: 98.8 (15 May 2021 21:25)  HR: 78 (16 May 2021 05:00) (64 - 82)  BP: 132/60 (16 May 2021 05:00) (119/53 - 163/67)  BP(mean): 87 (16 May 2021 05:00) (77 - 105)  RR: 18 (16 May 2021 05:00) (16 - 18)  SpO2: 96% (16 May 2021 05:00) (91% - 97%)   I&O's Detail    15 May 2021 07:01  -  16 May 2021 07:00  --------------------------------------------------------  IN:    IV PiggyBack: 425 mL    IV PiggyBack: 450 mL    Lactated Ringers: 950 mL  Total IN: 1825 mL    OUT:    Indwelling Catheter - Urethral (mL): 1305 mL  Total OUT: 1305 mL    Total NET: 520 mL      16 May 2021 07:01  -  16 May 2021 07:42  --------------------------------------------------------  IN:    Lactated Ringers: 50 mL  Total IN: 50 mL    OUT:  Total OUT: 0 mL    Total NET: 50 mL        Daily     Daily     Physical Exam:   GEN: NAD, AAOx3  HEENT: MMM, no icterus  CV: S1 S2 RRR, +CHIQUI  Lung: CTAB  Abd: soft NT ND +BS  Ext: no c/c/e, no groin hematoma  Neuro: no focal neuro deficit    MEDICATIONS  (STANDING):  atorvastatin 10 milliGRAM(s) Oral at bedtime  lactated ringers. 1000 milliLiter(s) (50 mL/Hr) IV Continuous <Continuous>  pantoprazole  Injectable 40 milliGRAM(s) IV Push daily  povidone iodine 5% Nasal Swab 1 Application(s) Both Nostrils once      LABS:                        10.5   11.90 )-----------( 150      ( 16 May 2021 06:01 )             32.7     05-16    139  |  103  |  12  ----------------------------<  74  3.6   |  22  |  0.58    Ca    8.0<L>      16 May 2021 06:01  Phos  2.0     05-15  Mg     2.4     05-15          PT/INR - ( 15 May 2021 03:39 )   PT: 12.1 sec;   INR: 1.01          PTT - ( 15 May 2021 03:39 )  PTT:25.9 sec    EKG demonstrates NSR w/ 1st degree avb,    < from: TTE Echo Limited or F/U (05.15.21 @ 10:18) >  --------------------------------------------------------------------------------  CONCLUSIONS:     1. The aortic valve is calcified. There is moderate aortic stenosis. The peak transvalvular velocity is 2.77 m/s, the mean transvalvular gradient is 20 mmHg, and the LVOT/AV velocity ratio is 0.36. The peak transaortic gradient is 31 mmHg. The aortic valve area (estimated via the continuity method) is 1.0 cm². There is mild aortic regurgitation.   2. Left ventricular hypertrophy present. The left ventricle is normal in size and systolic function with a calculated ejection fraction of 64%.   3. Compared to the previous TTE performed on 5/14/2021, peak and mean aortic valve gradients have decreased.    < end of copied text >

## 2021-05-16 NOTE — PROGRESS NOTE ADULT - ASSESSMENT
89yo F PMH aortic stenosis, HTN, HLD, GERD, spinal stenosis, multiple syncopal episodes s/p PPM, diverticulosis presenting with L hip pain after mechanical fall, found to have L FNF, also found to have severe AS on echo s/p BAV and awaiting OR for fracture    #Severe AS: s/p BAV, maintain euvolemic state, f/u cardiology recs  #Preop evaluation: s/p BAV for severe AS f/u cardiology recommendations- repeat TTE with improved gradients after BAV, can proceed to OR without further cardiac workup, OR for L FNF  #UTI: mild symptoms, last day of abx today 5/16, f/u urine culture  #Dysphagia: s/s eval after OR  #GERD: cw PPI  #Spinal stenosis   #Compression Fx: pain control  #HLD: c/w statin  #HTN: c/w norvasc post op   #Dispo: OR for L FNF     Will Continue to follow

## 2021-05-17 LAB
ANION GAP SERPL CALC-SCNC: 16 MMOL/L — SIGNIFICANT CHANGE UP (ref 5–17)
BUN SERPL-MCNC: 21 MG/DL — SIGNIFICANT CHANGE UP (ref 7–23)
CALCIUM SERPL-MCNC: 8 MG/DL — LOW (ref 8.4–10.5)
CHLORIDE SERPL-SCNC: 108 MMOL/L — SIGNIFICANT CHANGE UP (ref 96–108)
CO2 SERPL-SCNC: 21 MMOL/L — LOW (ref 22–31)
CREAT SERPL-MCNC: 0.67 MG/DL — SIGNIFICANT CHANGE UP (ref 0.5–1.3)
GLUCOSE SERPL-MCNC: 124 MG/DL — HIGH (ref 70–99)
HCT VFR BLD CALC: 32.3 % — LOW (ref 34.5–45)
HGB BLD-MCNC: 10.3 G/DL — LOW (ref 11.5–15.5)
MCHC RBC-ENTMCNC: 29.1 PG — SIGNIFICANT CHANGE UP (ref 27–34)
MCHC RBC-ENTMCNC: 31.9 GM/DL — LOW (ref 32–36)
MCV RBC AUTO: 91.2 FL — SIGNIFICANT CHANGE UP (ref 80–100)
NRBC # BLD: 0 /100 WBCS — SIGNIFICANT CHANGE UP (ref 0–0)
PLATELET # BLD AUTO: 217 K/UL — SIGNIFICANT CHANGE UP (ref 150–400)
POTASSIUM SERPL-MCNC: 4.4 MMOL/L — SIGNIFICANT CHANGE UP (ref 3.5–5.3)
POTASSIUM SERPL-SCNC: 4.4 MMOL/L — SIGNIFICANT CHANGE UP (ref 3.5–5.3)
RBC # BLD: 3.54 M/UL — LOW (ref 3.8–5.2)
RBC # FLD: 14.6 % — HIGH (ref 10.3–14.5)
SODIUM SERPL-SCNC: 145 MMOL/L — SIGNIFICANT CHANGE UP (ref 135–145)
WBC # BLD: 9.88 K/UL — SIGNIFICANT CHANGE UP (ref 3.8–10.5)
WBC # FLD AUTO: 9.88 K/UL — SIGNIFICANT CHANGE UP (ref 3.8–10.5)

## 2021-05-17 PROCEDURE — 99232 SBSQ HOSP IP/OBS MODERATE 35: CPT

## 2021-05-17 PROCEDURE — 99233 SBSQ HOSP IP/OBS HIGH 50: CPT

## 2021-05-17 RX ADMIN — Medication 1000 MILLIGRAM(S): at 22:57

## 2021-05-17 RX ADMIN — Medication 2000 MILLIGRAM(S): at 06:11

## 2021-05-17 RX ADMIN — PANTOPRAZOLE SODIUM 40 MILLIGRAM(S): 20 TABLET, DELAYED RELEASE ORAL at 11:34

## 2021-05-17 RX ADMIN — ATORVASTATIN CALCIUM 10 MILLIGRAM(S): 80 TABLET, FILM COATED ORAL at 22:33

## 2021-05-17 RX ADMIN — Medication 325 MILLIGRAM(S): at 17:28

## 2021-05-17 RX ADMIN — Medication 1000 MILLIGRAM(S): at 22:33

## 2021-05-17 RX ADMIN — SENNA PLUS 2 TABLET(S): 8.6 TABLET ORAL at 22:33

## 2021-05-17 RX ADMIN — POLYETHYLENE GLYCOL 3350 17 GRAM(S): 17 POWDER, FOR SOLUTION ORAL at 22:33

## 2021-05-17 RX ADMIN — Medication 1000 MILLIGRAM(S): at 13:23

## 2021-05-17 NOTE — PROGRESS NOTE ADULT - ASSESSMENT
89yo F PMH aortic stenosis, HTN, HLD, GERD, spinal stenosis, multiple syncopal episodes s/p PPM, diverticulosis presenting with L hip pain after mechanical fall, found to have L FNF, also found to have severe AS on echo s/p BAV and awaiting OR for fracture    #Severe AS: s/p BAV, maintain euvolemic state, f/u cardiology recs  #Post-op: doing well, c/w pain control (avoid opiates), c/w bowel regimen, c/w IS  #UTI: s/p course of ceftriaxone  #Dysphagia: f/u s/s recs  #GERD: cw PPI  #Spinal stenosis   #Compression Fx: pain control  #HLD: c/w statin  #HTN: c/w norvasc post op   #Dispo: pending PT  #DVT ppx: ASA BID

## 2021-05-17 NOTE — PROGRESS NOTE ADULT - SUBJECTIVE AND OBJECTIVE BOX
INCOMPLETE    INTERVAL EVENTS:    PAST MEDICAL & SURGICAL HISTORY:  No pertinent past medical history        MEDICATIONS  (STANDING):  acetaminophen   Tablet .. 1000 milliGRAM(s) Oral every 8 hours  aspirin enteric coated 325 milliGRAM(s) Oral two times a day  atorvastatin 10 milliGRAM(s) Oral at bedtime  BUpivacaine liposome 1.3% Injectable (no eMAR) 20 milliLiter(s) Local Injection once  HYDROmorphone  Injectable 0.5 milliGRAM(s) IV Push once  lactated ringers. 1000 milliLiter(s) (60 mL/Hr) IV Continuous <Continuous>  pantoprazole  Injectable 40 milliGRAM(s) IV Push daily  polyethylene glycol 3350 17 Gram(s) Oral at bedtime  povidone iodine 5% Nasal Swab 1 Application(s) Both Nostrils once  senna 2 Tablet(s) Oral at bedtime    MEDICATIONS  (PRN):  bisacodyl Suppository 10 milliGRAM(s) Rectal daily PRN Constipation  magnesium hydroxide Suspension 30 milliLiter(s) Oral daily PRN Constipation  ondansetron Injectable 4 milliGRAM(s) IV Push every 6 hours PRN Nausea and/or Vomiting  traMADol 50 milliGRAM(s) Oral every 4 hours PRN Severe Pain (7 - 10)  traMADol 25 milliGRAM(s) Oral every 6 hours PRN Moderate Pain (4 - 6)    Vital Signs Last 24 Hrs  T(C): 36.7 (17 May 2021 05:31), Max: 36.7 (17 May 2021 05:31)  T(F): 98.1 (17 May 2021 05:31), Max: 98.1 (17 May 2021 05:31)  HR: 73 (17 May 2021 05:31) (60 - 82)  BP: 126/61 (17 May 2021 05:31) (113/57 - 139/57)  BP(mean): 85 (16 May 2021 18:51) (74 - 85)  RR: 18 (17 May 2021 05:31) (14 - 18)  SpO2: 97% (17 May 2021 05:31) (93% - 100%)    PHYSICAL EXAM:  GEN: Awake, alert. NAD.   HEENT: NCAT, PERRL, EOMI. Mucosa moist. No JVD.  RESP: CTA b/l  CV: RRR. Normal S1/S2. No m/r/g.  ABD: Soft. NT/ND. BS+  EXT: Warm. No edema, clubbing, or cyanosis.   NEURO: AAOx3. No focal deficits.     LABS:                        10.3   9.88  )-----------( 217      ( 17 May 2021 07:37 )             32.3     05-17    145  |  108  |  21  ----------------------------<  124<H>  4.4   |  21<L>  |  0.67    Ca    8.0<L>      17 May 2021 07:37      I&O's Summary    16 May 2021 07:01  -  17 May 2021 07:00  --------------------------------------------------------  IN: 1070 mL / OUT: 1190 mL / NET: -120 mL      < from: TTE Echo Limited or F/U (05.15.21 @ 10:18) >  1. The aortic valve is calcified. There is moderate aortic stenosis. The peak transvalvular velocity is 2.77 m/s, the mean transvalvular gradient is 20 mmHg, and the LVOT/AV velocity ratio is 0.36. The peak transaortic gradient is 31 mmHg. The aortic valve area (estimated via the continuity method) is 1.0 cm². There is mild aortic regurgitation.   2. Left ventricular hypertrophy present. The left ventricle is normal in size and systolic function with a calculated ejection fraction of 64%.   3. Compared to the previous TTE performed on 5/14/2021, peak and mean aortic valve gradients have decreased.  < end of copied text >       INTERVAL EVENTS:  Patient seen and examined at bedside; found resting comfortably in bed. No acute overnight events reported. Patient denies CP and SOB.     PAST MEDICAL & SURGICAL HISTORY:  No pertinent past medical history    MEDICATIONS  (STANDING):  acetaminophen   Tablet .. 1000 milliGRAM(s) Oral every 8 hours  aspirin enteric coated 325 milliGRAM(s) Oral two times a day  atorvastatin 10 milliGRAM(s) Oral at bedtime  BUpivacaine liposome 1.3% Injectable (no eMAR) 20 milliLiter(s) Local Injection once  HYDROmorphone  Injectable 0.5 milliGRAM(s) IV Push once  lactated ringers. 1000 milliLiter(s) (60 mL/Hr) IV Continuous <Continuous>  pantoprazole  Injectable 40 milliGRAM(s) IV Push daily  polyethylene glycol 3350 17 Gram(s) Oral at bedtime  povidone iodine 5% Nasal Swab 1 Application(s) Both Nostrils once  senna 2 Tablet(s) Oral at bedtime    MEDICATIONS  (PRN):  bisacodyl Suppository 10 milliGRAM(s) Rectal daily PRN Constipation  magnesium hydroxide Suspension 30 milliLiter(s) Oral daily PRN Constipation  ondansetron Injectable 4 milliGRAM(s) IV Push every 6 hours PRN Nausea and/or Vomiting  traMADol 50 milliGRAM(s) Oral every 4 hours PRN Severe Pain (7 - 10)  traMADol 25 milliGRAM(s) Oral every 6 hours PRN Moderate Pain (4 - 6)    Vital Signs Last 24 Hrs  T(C): 36.7 (17 May 2021 05:31), Max: 36.7 (17 May 2021 05:31)  T(F): 98.1 (17 May 2021 05:31), Max: 98.1 (17 May 2021 05:31)  HR: 73 (17 May 2021 05:31) (60 - 82)  BP: 126/61 (17 May 2021 05:31) (113/57 - 139/57)  BP(mean): 85 (16 May 2021 18:51) (74 - 85)  RR: 18 (17 May 2021 05:31) (14 - 18)  SpO2: 97% (17 May 2021 05:31) (93% - 100%)    PHYSICAL EXAM:  Appearance: NAD. Speaking in full sentences.   HEENT: No pallor noted.  Conjunctiva clear b/l. Moist oral mucosa.  Cardiovascular: RRR, 3/6 systolic murmur at RSB  Respiratory: Lungs CTAB.   Gastrointestinal:  Soft, nontender. Non-distended. Non-rigid.	  Extremities: No edema b/l. No erythema b/l. LE WWP b/l.  Vascular: DP intact  Neurologic:  Alert and awake. Moving all extremities. Following commands.     LABS:                        10.3   9.88  )-----------( 217      ( 17 May 2021 07:37 )             32.3     05-17    145  |  108  |  21  ----------------------------<  124<H>  4.4   |  21<L>  |  0.67    Ca    8.0<L>      17 May 2021 07:37      I&O's Summary    16 May 2021 07:01  -  17 May 2021 07:00  --------------------------------------------------------  IN: 1070 mL / OUT: 1190 mL / NET: -120 mL      < from: TTE Echo Limited or F/U (05.15.21 @ 10:18) >  1. The aortic valve is calcified. There is moderate aortic stenosis. The peak transvalvular velocity is 2.77 m/s, the mean transvalvular gradient is 20 mmHg, and the LVOT/AV velocity ratio is 0.36. The peak transaortic gradient is 31 mmHg. The aortic valve area (estimated via the continuity method) is 1.0 cm². There is mild aortic regurgitation.   2. Left ventricular hypertrophy present. The left ventricle is normal in size and systolic function with a calculated ejection fraction of 64%.   3. Compared to the previous TTE performed on 5/14/2021, peak and mean aortic valve gradients have decreased.  < end of copied text >

## 2021-05-17 NOTE — PROGRESS NOTE ADULT - ATTENDING COMMENTS
Please see fellow note for full details. I have reviewed the case, examined the patient and reviewed the echo and agree with plan.
Initial attending contact date  5/17/21    . See fellow note written above for details. I reviewed the fellow documentation. I have personally seen and examined this patient. I reviewed vitals, labs, medications, cardiac studies, and additional imaging. I agree with the above fellow's findings and plans as written above with the following additions/statements.      89F PMH AS, HTN, PPM (syncopal episodes), spinal stenosis presented with mechanical fall and found to have L femoral neck fracture pending L hip hemiarthroplasty w/ ortho. Cardiology consulted for preoperative evaluation, found to have severe AS    -pt now s/p BAV and s/p L hip hemiarthroplasty   -feels well, without active complaints  -resume patient's home Amlodipine 5mg  - continue Lipitor 10mg  -Further plan for TAVR per CTS

## 2021-05-17 NOTE — PROGRESS NOTE ADULT - ASSESSMENT
89 year old F, PMHx  PMH AS, HTN, PPM (syncopal episodes), spinal stenosis presented with fall/hip fracture found to have severe AS and s/p BAV.    -stable at this point  -BP stable on norvasc, holding losartan  ECHO with decrease gradients  -Structural heart will sign off, please have patient follow up with Dr. Fry as outpatient 2 weeks after discharge.

## 2021-05-17 NOTE — PROGRESS NOTE ADULT - ASSESSMENT
89F PMH AS, HTN, PPM (syncopal episodes), spinal stenosis presented with mechanical fall and found to have L femoral neck fracture pending L hip hemiarthroplasty w/ ortho. Cardiology consulted for preoperative evaluation.    Perioperative Cardiac Assessment   No active chest pain. No SOB. No orthopnea. EKG demonstrates NSR w/ 1st degree avb, LVH based on david criteria  RCRI: 0/class I, 3.9% for MI, death or cardiac arrest at 30 days  [place]: 0.6% for MI or cardiac arrest at 30 days  NSQIP: 0.8% for cardiac complication  METS<4  Hx of AS, patient now s/p BAV and femoral fx sx.  - resume patient's home Amlodipine 5mg  - continue Lipitor 10mg    Recommendations final when signed by attending.

## 2021-05-17 NOTE — SWALLOW BEDSIDE ASSESSMENT ADULT - SWALLOW EVAL: PROGNOSIS
Unclear in the absence of instrumental assessment, but suspect that w Pt's advanced age & h/o mult medical problems prognosis for improvement in swallowing is guarded.

## 2021-05-17 NOTE — PHYSICAL THERAPY INITIAL EVALUATION ADULT - ACTIVE RANGE OF MOTION EXAMINATION, REHAB EVAL
LLE AROM limited due to pain (LLE ankle DF/PF WFL)/bilateral upper extremity Active ROM was WFL (within functional limits)/Right LE Active ROM was WFL (within functional limits)

## 2021-05-17 NOTE — SWALLOW BEDSIDE ASSESSMENT ADULT - PHARYNGEAL PHASE
Hyolaryngeal excursion palpated during swallow trigger & appears brisk & timely. +signs of aspiration x2 w thin liquid only.

## 2021-05-17 NOTE — SWALLOW BEDSIDE ASSESSMENT ADULT - SWALLOW EVAL: DIAGNOSIS
Pt p/w at least a mild pharyngeal dysphagia characterized by inconsistent cough w thin liquids suggestive of aspiration. No overt signs of aspiration noted with NTL or solid textures.

## 2021-05-17 NOTE — PHYSICAL THERAPY INITIAL EVALUATION ADULT - GENERAL OBSERVATIONS, REHAB EVAL
Pt received semi supine, +L hip incision bandage C/D/I, +abduction pillow, +bilateral SCDs, +heplock, +telemetry, +pulse ox, NAD, agreeable to PT.

## 2021-05-17 NOTE — SWALLOW BEDSIDE ASSESSMENT ADULT - ADDITIONAL RECOMMENDATIONS
Allow small sips of water in between meals for Pt comfort & hydration. Oral meds can be administered with NTL.

## 2021-05-17 NOTE — SWALLOW BEDSIDE ASSESSMENT ADULT - COMMENTS
Received awake & alert. Language skills WFL at the conversational level. RN reports Pt was witnessed coughing with PO meds prior to sx and again post-op on dysphagia screen. Pt denies difficulty with swallowing, says only that she coughs if she isn't seated fully upright and that sometimes when coughing she brings up phlegm from GERD.

## 2021-05-17 NOTE — SWALLOW BEDSIDE ASSESSMENT ADULT - NS SPL SWALLOW CLINIC TRIAL FT
Unclear etiology of dysphagia although Pt does have increased risk factors including adv age & spinal stenosis (if in the cervical region). Pt agreed that it was easier to swallow NTL; however if the goal is to resume taking thin liquids, instrumental assessment would be beneficial to visualize pharyngeal swallow function & determine candidacy for tx and/or intervention.

## 2021-05-17 NOTE — PROGRESS NOTE ADULT - SUBJECTIVE AND OBJECTIVE BOX
Ortho Note    Subjective:  Pt comfortable without complaints, pain controlled with current pain medication regimen. Left Hip aquacell, c,d,i.   Denies CP, SOB, N/V, numbness/tingling       Vital Signs Last 24 Hrs  T(C): 36.7 (05-17-21 @ 08:30), Max: 36.7 (05-17-21 @ 08:30)  T(F): 98 (05-17-21 @ 08:30), Max: 98 (05-17-21 @ 08:30)  HR: 76 (05-17-21 @ 08:30) (76 - 76)  BP: 105/51 (05-17-21 @ 08:30) (105/51 - 105/51)  BP(mean): --  RR: 17 (05-17-21 @ 08:30) (17 - 17)  SpO2: 95% (05-17-21 @ 08:30) (95% - 95%)  AVSS      Objective:    Physical Exam:  General: Pt Alert and oriented, NAD  Left hip dressing, c,d,i  Pulses: +2 pedal pulses, wwp toes, cap refill less than 3 seconds  Sensation: SILT intact  Motor: EHL/FHL/TA/GS- firing        Plan of Care:  A/P: 89yFemale POD#1 s/p Left Hip ann  - Stable- afebrile, nontoxic apperance  - Pain Control- tramadol 25 Po Q4h prn moderate pain , tramadol 50mg PO Q6h prn severe pain, tylenol 1000mg PO Q8h   - DVT ppx: SCDS  - PT, WBS: aspirin 325mg PO BID  - appreciate medicine recs  - appreciate cardiology recs  - bowel regimen, IS use  -dispo: pending pt eval    Ortho Pager [phone]

## 2021-05-17 NOTE — PROGRESS NOTE ADULT - SUBJECTIVE AND OBJECTIVE BOX
OVERNIGHT EVENTS:    SUBJECTIVE / INTERVAL HPI: Patient seen and examined at bedside. No pain, denies SOB. States dysphagia started after her fracture, is hungry.     VITAL SIGNS:  Vital Signs Last 24 Hrs  T(C): 36.7 (17 May 2021 08:30), Max: 36.7 (17 May 2021 05:31)  T(F): 98 (17 May 2021 08:30), Max: 98.1 (17 May 2021 05:31)  HR: 76 (17 May 2021 08:30) (60 - 82)  BP: 105/51 (17 May 2021 08:30) (105/51 - 139/57)  BP(mean): 85 (16 May 2021 18:51) (74 - 85)  RR: 17 (17 May 2021 08:30) (14 - 18)  SpO2: 95% (17 May 2021 08:30) (93% - 100%)    PHYSICAL EXAM:    General: WDWN, pleasant, sharp, awake and alert  HEENT: NC/AT;   Neck: supple  Cardiovascular: +S1/S2; RRR, crescendo decrescendo systolic murmur  Respiratory: L base rales  Gastrointestinal: soft, NT/ND;   Extremities: WWP; L hip tender, dressing c/d/i, ice packs    MEDICATIONS:  MEDICATIONS  (STANDING):  acetaminophen   Tablet .. 1000 milliGRAM(s) Oral every 8 hours  aspirin enteric coated 325 milliGRAM(s) Oral two times a day  atorvastatin 10 milliGRAM(s) Oral at bedtime  BUpivacaine liposome 1.3% Injectable (no eMAR) 20 milliLiter(s) Local Injection once  HYDROmorphone  Injectable 0.5 milliGRAM(s) IV Push once  lactated ringers. 1000 milliLiter(s) (60 mL/Hr) IV Continuous <Continuous>  pantoprazole  Injectable 40 milliGRAM(s) IV Push daily  polyethylene glycol 3350 17 Gram(s) Oral at bedtime  povidone iodine 5% Nasal Swab 1 Application(s) Both Nostrils once  senna 2 Tablet(s) Oral at bedtime    MEDICATIONS  (PRN):  bisacodyl Suppository 10 milliGRAM(s) Rectal daily PRN Constipation  magnesium hydroxide Suspension 30 milliLiter(s) Oral daily PRN Constipation  ondansetron Injectable 4 milliGRAM(s) IV Push every 6 hours PRN Nausea and/or Vomiting  traMADol 50 milliGRAM(s) Oral every 4 hours PRN Severe Pain (7 - 10)  traMADol 25 milliGRAM(s) Oral every 6 hours PRN Moderate Pain (4 - 6)      ALLERGIES:  Allergies    amoxicillin (Unknown)  ciprofloxacin (Unknown)  metronidazole (Unknown)    Intolerances        LABS:                        10.3   9.88  )-----------( 217      ( 17 May 2021 07:37 )             32.3     05-17    145  |  108  |  21  ----------------------------<  124<H>  4.4   |  21<L>  |  0.67    Ca    8.0<L>      17 May 2021 07:37          CAPILLARY BLOOD GLUCOSE          RADIOLOGY & ADDITIONAL TESTS: Reviewed.    ASSESSMENT:    PLAN:

## 2021-05-17 NOTE — SWALLOW BEDSIDE ASSESSMENT ADULT - ORAL PREPARATORY PHASE
GI DAILY PROGRESS NOTE    Admit Date:  10/31/2017    Today's Date:  11/1/2017    CC: Abnormal LFTS    Subjective:     Patient: difficult to obtain history. Responds to pain in the left lower quadrant. No BM in 2 days. Medications:   Current Facility-Administered Medications   Medication Dose Route Frequency    docusate sodium (COLACE) capsule 100 mg  100 mg Oral BID PRN    magnesium oxide (MAG-OX) tablet 800 mg  800 mg Oral BID    meloxicam (MOBIC) tablet 7.5 mg  7.5 mg Oral BID    multivitamin, tx-iron-ca-min (THERA-M w/ IRON) tablet 1 Tab  1 Tab Oral DAILY    pantoprazole (PROTONIX) tablet 40 mg  40 mg Oral ACB    prochlorperazine (COMPAZINE) tablet 10 mg  10 mg Oral Q6H PRN    pyridoxine (vitamin B6) (VITAMIN B-6) tablet 50 mg  50 mg Oral DAILY    traMADol (ULTRAM) tablet 50 mg  50 mg Oral Q6H PRN    ondansetron (ZOFRAN) injection 4 mg  4 mg IntraVENous Q6H PRN    0.9% sodium chloride infusion  125 mL/hr IntraVENous CONTINUOUS    vancomycin (VANCOCIN) 1500 mg in  ml infusion  1,500 mg IntraVENous Q24H    cefTRIAXone (ROCEPHIN) 1 g in 0.9% sodium chloride (MBP/ADV) 50 mL  1 g IntraVENous Q24H     Facility-Administered Medications Ordered in Other Encounters   Medication Dose Route Frequency    saline peripheral flush soln 10 mL  10 mL InterCATHeter PRN       Review of Systems:  ROS was obtained, with pertinent positives as listed above. No chest pain or SOB.     Diet:  NPO for radiology; clear liquids otherwise    Objective:   Vitals:  Visit Vitals    /65 (BP 1 Location: Left arm, BP Patient Position: At rest)    Pulse (!) 102    Temp 97.7 °F (36.5 °C)    Resp 20    Ht 5' 8\" (1.727 m)    Wt 79.5 kg (175 lb 4.8 oz)    SpO2 90%    BMI 26.65 kg/m2     Intake/Output:  11/01 0701 - 11/01 1900  In: 1963 [I.V.:1963]  Out: -   10/30 1901 - 11/01 0700  In: -   Out: 225 [Urine:225]  Exam:  General appearance: alert, cooperative, no distress JAUNDICE  Lungs: clear to auscultation bilaterally anteriorly DECREASED IN BASES  Heart: regular rate and rhythm TACHY  Abdomen: soft, POSITIVE LLQ; DISTENDED; Bowel sounds HYPOACTIVE WITH TINKLING. No masses, no organomegaly  Extremities: extremities normal, atraumatic, no cyanosis or edema  Neuro:  alert and oriented TO PERSON AND PLACE ONLY; ? ASTERIXIS    Data Review (Labs):    Recent Labs      11/01/17   0721  11/01/17   0716  10/31/17   2058  10/31/17   1312   WBC   --   8.8  11.2*  13.8*   HGB   --   9.7*  10.9*  11.3*   HCT   --   28.2*  31.9*  32.9*   PLT   --   140*  149*  171   MCV   --   99.3*  99.7*  99.7*   NA   --    --    --   137   K   --    --    --   4.9   CL   --    --    --   100   CO2   --    --    --   23   BUN   --    --    --   42*   CREA   --    --    --   1.54*   CA   --    --    --   9.1   MG   --    --   2.1   --    GLU   --    --    --   160*   AP   --    --    --   563*   SGOT   --    --    --   99*   ALT   --    --    --   66*   TBILI   --    --    --   10.9*   ALB   --    --    --   1.6*   TP   --    --    --   5.9*   PTP  16.3*   --    --    --    INR  1.5*   --    --    --        Assessment:     Active Problems:    Acute liver failure (10/9/2017)      Altered mental state (10/31/2017)      Failure to thrive (0-17) (10/31/2017)      Acute kidney injury ([facility] [address] (10/31/2017)      Colon cancer metastasized to liver [facility]) (10/31/2017)    77 yo WM with h/o colon cancer diagnosed in 2012 treated with palliative chemotherapy who was later found to have Stage IV disease with metastatic lesion so liver. Underwent further chemotherapy and is status-post sigmoid colon segmental resection with primary anastomosis and segementectomy of liver mets on 10/10/16. Most recently treated with Vectibix x1 cycle on 10/19/17 with repeat dosing planned for 11/2/17. Recent ERCP with 1.5 cm stricture of common hepatic duct on cholangiogram.  ERCP performed 10/10/17 with sphincterotomy and placement of 10 Fr x 10 cm plastic stent.  There was concern at time of ERCP exam that stricture would not be amendable to a metal stent. Patient currently jaundiced with elevated T.biliubin at 10.9. Interestingly, alkaline phosphatase and transaminases have been steadily trending down over the last 2 weeks. It would be less common for a plastic biliary stent to become occluded in 3 weeks however migration is a consideration. Given persistent elevation of total bilirubin, hepatic congestion secondary to known liver metastatic disease is suspected as cause of persistent jaundice. INR was 1.2 on 10/10/17 suggests intact synthetic function of liver. Repeat INR 1.5 on 11/1/17, LFTS pending as well as repeat US.  BC negative thus far. Plan:     1. Await repeat RUQ US to assess for biliary dilation and stent position. Consider ERCP if abnormal.   2.Await LFTs  3.continue antibiotic therapy  4. KUB to assess for small bowel obstruction  5. Keep NPO for now in case of need for ERCP later today    Suze Torres in collaboration with Dr. Ivonne Wade  [facility]    I have seen and examined the patient, and I have directed and agreed to the plan as described. He is very weak and minimally responsive to me. He is tender in the RUQ on exam    He has had significant improvement in transaminases and alkaline phosphatase since the last ERCP, although the AP remains very elevated. Bilirubin has not improved. This argues that he has a patent and functional biliary stent. The ultrasound confirms that the stent is decompressing his biliary tree. This patient has intrahepatic cholestasis. This may be a delayed recovery from high-grade extrahepatic obstruction, a drug-induced injury, or due to infiltrating tumor. ERCP is not indicated. Among his medications, Rocephin is the most likely cause of drug-induced cholestasis and should be changed to an alternative if he truly needs an antibiotic.   He does not need an antibiotic for any GI indication. A fluoroquinolone would be reasonable, but cephalosporins and penicillins should be avoided. Additionally, Compazine can cause cholestasis and should probably be changed. Zofran would not cause this. At this time, there is no other intervention that I can recommend.     Claudy Muniz MD Pt accepted very small "tastes" of puree, refused to take entire bolus from spoon at once/Within functional limits

## 2021-05-17 NOTE — PHYSICAL THERAPY INITIAL EVALUATION ADULT - MANUAL MUSCLE TESTING RESULTS, REHAB EVAL
functional mobility testing; >/=3+/5 bilateral UE and RLE (LLE testing limited due to pain)/grossly assessed due to

## 2021-05-17 NOTE — SWALLOW BEDSIDE ASSESSMENT ADULT - SLP PERTINENT HISTORY OF CURRENT PROBLEM
89 yo W adm after fall, found to have L femoral neck fx now s/p L hip hemiarthroplasty. H/O AS, HTN, GERD, spinal stenosis

## 2021-05-17 NOTE — PROGRESS NOTE ADULT - SUBJECTIVE AND OBJECTIVE BOX
S: Patient seen and examined. Pt. doing well, Pain endorsed but controlled. No acute events overnight.    Vital Signs Last 24 Hrs  T(C): 36.7 (17 May 2021 05:31), Max: 36.7 (17 May 2021 05:31)  T(F): 98.1 (17 May 2021 05:31), Max: 98.1 (17 May 2021 05:31)  HR: 73 (17 May 2021 05:31) (60 - 82)  BP: 126/61 (17 May 2021 05:31) (113/57 - 139/57)  BP(mean): 85 (16 May 2021 18:51) (74 - 85)  RR: 18 (17 May 2021 05:31) (14 - 18)  SpO2: 97% (17 May 2021 05:31) (93% - 100%)    NAD, AOx3, comfortable  Motor: 5/5 GS/TA/EHL/FHL    Sensation: SILT   Pulses: WWP, DP/PT 2+    Dressing: C/D/I                          10.3   9.88  )-----------( 217      ( 17 May 2021 07:37 )             32.3         A/P:  89y Female s/p L hip ann-posterior on 5/16          -Stable  -Pain Control  -PT/WBAT, posterior hip precautions  -DVT ppx: ASA  -Advance diet as tolerated  -f/u AM labs  -Dispo: pending PT

## 2021-05-17 NOTE — PHYSICAL THERAPY INITIAL EVALUATION ADULT - PERTINENT HX OF CURRENT PROBLEM, REHAB EVAL
89F [facility] inner ear balance issue, aortic valve stenosis, presenting with L hip pain after mechanical fall earlier today at home. Pt states she lives alone and fell, was finally able to crawl to phone to call for help. Denies head trauma/LOC. Reports pain L hip and R groin but otherwise no pain elsewhere. Denies numbness, tingling LLE. Pre-injury activity limited to mostly in-home activity. Able to walk 2-4 city blocks.

## 2021-05-17 NOTE — SWALLOW BEDSIDE ASSESSMENT ADULT - CONSISTENCIES ADMINISTERED
1x tsp thin; 6x cup sip thin; 6x cup sip NTL; 1 tsp puree, 1 whole cracker/thin liquid/nectar thick/puree/solid

## 2021-05-18 LAB
ANION GAP SERPL CALC-SCNC: 6 MMOL/L — SIGNIFICANT CHANGE UP (ref 5–17)
BUN SERPL-MCNC: 23 MG/DL — SIGNIFICANT CHANGE UP (ref 7–23)
CALCIUM SERPL-MCNC: 8.2 MG/DL — LOW (ref 8.4–10.5)
CHLORIDE SERPL-SCNC: 106 MMOL/L — SIGNIFICANT CHANGE UP (ref 96–108)
CO2 SERPL-SCNC: 28 MMOL/L — SIGNIFICANT CHANGE UP (ref 22–31)
CREAT SERPL-MCNC: 0.58 MG/DL — SIGNIFICANT CHANGE UP (ref 0.5–1.3)
GLUCOSE SERPL-MCNC: 121 MG/DL — HIGH (ref 70–99)
HCT VFR BLD CALC: 28.6 % — LOW (ref 34.5–45)
HGB BLD-MCNC: 9.3 G/DL — LOW (ref 11.5–15.5)
MCHC RBC-ENTMCNC: 29.3 PG — SIGNIFICANT CHANGE UP (ref 27–34)
MCHC RBC-ENTMCNC: 32.5 GM/DL — SIGNIFICANT CHANGE UP (ref 32–36)
MCV RBC AUTO: 90.2 FL — SIGNIFICANT CHANGE UP (ref 80–100)
NRBC # BLD: 0 /100 WBCS — SIGNIFICANT CHANGE UP (ref 0–0)
PLATELET # BLD AUTO: 200 K/UL — SIGNIFICANT CHANGE UP (ref 150–400)
POTASSIUM SERPL-MCNC: 4.1 MMOL/L — SIGNIFICANT CHANGE UP (ref 3.5–5.3)
POTASSIUM SERPL-SCNC: 4.1 MMOL/L — SIGNIFICANT CHANGE UP (ref 3.5–5.3)
RBC # BLD: 3.17 M/UL — LOW (ref 3.8–5.2)
RBC # FLD: 15 % — HIGH (ref 10.3–14.5)
SODIUM SERPL-SCNC: 140 MMOL/L — SIGNIFICANT CHANGE UP (ref 135–145)
WBC # BLD: 11.29 K/UL — HIGH (ref 3.8–10.5)
WBC # FLD AUTO: 11.29 K/UL — HIGH (ref 3.8–10.5)

## 2021-05-18 PROCEDURE — 99232 SBSQ HOSP IP/OBS MODERATE 35: CPT

## 2021-05-18 RX ADMIN — Medication 325 MILLIGRAM(S): at 18:58

## 2021-05-18 RX ADMIN — Medication 1000 MILLIGRAM(S): at 22:49

## 2021-05-18 RX ADMIN — Medication 1000 MILLIGRAM(S): at 13:31

## 2021-05-18 RX ADMIN — POLYETHYLENE GLYCOL 3350 17 GRAM(S): 17 POWDER, FOR SOLUTION ORAL at 22:49

## 2021-05-18 RX ADMIN — PANTOPRAZOLE SODIUM 40 MILLIGRAM(S): 20 TABLET, DELAYED RELEASE ORAL at 09:12

## 2021-05-18 RX ADMIN — Medication 1000 MILLIGRAM(S): at 23:52

## 2021-05-18 RX ADMIN — Medication 325 MILLIGRAM(S): at 07:04

## 2021-05-18 RX ADMIN — SENNA PLUS 2 TABLET(S): 8.6 TABLET ORAL at 22:49

## 2021-05-18 RX ADMIN — ATORVASTATIN CALCIUM 10 MILLIGRAM(S): 80 TABLET, FILM COATED ORAL at 22:49

## 2021-05-18 RX ADMIN — Medication 1000 MILLIGRAM(S): at 07:04

## 2021-05-18 NOTE — DIETITIAN INITIAL EVALUATION ADULT. - OTHER INFO
89F [facility] inner ear balance issue, aortic valve stenosis, presenting with L hip pain after mechanical fall earlier today at home. Pt states she lives alone and fell, was finally able to crawl to phone to call for help. Pt now s/p L hip ann-posterior on 5/16. S/p SLP eval 5/17, with recommendations for Regular solids & nectar-thick liquids (NTL). Planning for d/c to [facility] when medically feasible.     On assessment, pt resting in bed without complaints. Currently on Dysphagia level 3 soft diet with nectar thick liquids tolerating Po well. Pt consistently meeting >50% of meals (coffee, orange juice and cereal). No reported n/v/d. Last BM 5/16- started on bowel regime. No abd pain or distention noted. Education provided on importance of adequate PO intake with emphasis on lean protein to support wound healing- pt receptive. Pt noting good appetite PTA. UBW is consistent with admission weight- pt denies weight changes. NKFA. Skin: Surgical incisions, armando score 15. No pain noted at this time- well controlled at time of assessment. Please see nutrition recs below. RD to follow

## 2021-05-18 NOTE — PROGRESS NOTE ADULT - SUBJECTIVE AND OBJECTIVE BOX
S: Patient seen and examined. Pt. doing well, Pain endorsed but controlled. No acute events overnight. TOV today    Vital Signs Last 24 Hrs  T(C): 36.8 (17 May 2021 22:25), Max: 36.8 (17 May 2021 22:25)  T(F): 98.3 (17 May 2021 22:25), Max: 98.3 (17 May 2021 22:25)  HR: 71 (18 May 2021 06:37) (66 - 83)  BP: 110/56 (18 May 2021 06:37) (91/53 - 142/56)  BP(mean): --  RR: 18 (18 May 2021 06:37) (17 - 20)  SpO2: 96% (18 May 2021 06:37) (91% - 97%)    NAD, AOx3, comfortable  Motor: 5/5 GS/TA/EHL/FHL    Sensation: SILT   Pulses: WWP, DP/PT 2+    Dressing: C/D/I                                       10.3   9.88  )-----------( 217      ( 17 May 2021 07:37 )             32.3              A/P:  89y Female s/p L hip ann-posterior on 5/16          -Stable  -Pain Control  -PT/WBAT, posterior hip precautions  -DVT ppx: ASA  -Advance diet as tolerated  -f/u AM labs  -Dispo: ERASMO

## 2021-05-18 NOTE — PROGRESS NOTE ADULT - SUBJECTIVE AND OBJECTIVE BOX
OVERNIGHT EVENTS: Urinary retention.     SUBJECTIVE / INTERVAL HPI: Patient seen and examined at bedside. No complaints, happy to have a diet. Worked w/ PT yesterday, recommended for ERASMO.     VITAL SIGNS:  Vital Signs Last 24 Hrs  T(C): 36.4 (18 May 2021 09:05), Max: 36.8 (17 May 2021 22:25)  T(F): 97.5 (18 May 2021 09:05), Max: 98.3 (17 May 2021 22:25)  HR: 72 (18 May 2021 09:05) (66 - 83)  BP: 116/56 (18 May 2021 09:05) (91/53 - 142/56)  BP(mean): --  RR: 18 (18 May 2021 09:05) (17 - 20)  SpO2: 94% (18 May 2021 09:05) (91% - 97%)    PHYSICAL EXAM:    General: WDWN, pleasant, sharp, awake and alert  HEENT: NC/AT;   Neck: supple  Cardiovascular: +S1/S2; RRR, crescendo decrescendo systolic murmur  Respiratory: clear  Gastrointestinal: soft, NT/ND;   Extremities: WWP; L hip tender, dressing c/d/i, ice packs    MEDICATIONS:  MEDICATIONS  (STANDING):  acetaminophen   Tablet .. 1000 milliGRAM(s) Oral every 8 hours  aspirin enteric coated 325 milliGRAM(s) Oral two times a day  atorvastatin 10 milliGRAM(s) Oral at bedtime  BUpivacaine liposome 1.3% Injectable (no eMAR) 20 milliLiter(s) Local Injection once  HYDROmorphone  Injectable 0.5 milliGRAM(s) IV Push once  lactated ringers. 1000 milliLiter(s) (60 mL/Hr) IV Continuous <Continuous>  pantoprazole  Injectable 40 milliGRAM(s) IV Push daily  polyethylene glycol 3350 17 Gram(s) Oral at bedtime  povidone iodine 5% Nasal Swab 1 Application(s) Both Nostrils once  senna 2 Tablet(s) Oral at bedtime    MEDICATIONS  (PRN):  bisacodyl Suppository 10 milliGRAM(s) Rectal daily PRN Constipation  magnesium hydroxide Suspension 30 milliLiter(s) Oral daily PRN Constipation  ondansetron Injectable 4 milliGRAM(s) IV Push every 6 hours PRN Nausea and/or Vomiting  traMADol 50 milliGRAM(s) Oral every 4 hours PRN Severe Pain (7 - 10)  traMADol 25 milliGRAM(s) Oral every 6 hours PRN Moderate Pain (4 - 6)      ALLERGIES:  Allergies    amoxicillin (Unknown)  ciprofloxacin (Unknown)  metronidazole (Unknown)    Intolerances        LABS:                        9.3    11.29 )-----------( 200      ( 18 May 2021 08:12 )             28.6     05-18    140  |  106  |  23  ----------------------------<  121<H>  4.1   |  28  |  0.58    Ca    8.2<L>      18 May 2021 08:12          CAPILLARY BLOOD GLUCOSE          RADIOLOGY & ADDITIONAL TESTS: Reviewed.    ASSESSMENT:    PLAN:

## 2021-05-18 NOTE — PROGRESS NOTE ADULT - ASSESSMENT
89yo F PMH aortic stenosis, HTN, HLD, GERD, spinal stenosis, multiple syncopal episodes s/p PPM, diverticulosis presenting with L hip pain after mechanical fall, found to have L FNF, also found to have severe AS on echo s/p BAV and L hip ann.     #Severe AS: s/p BAV, maintain euvolemic state, f/u cardiology recs  #Post-op: doing well, c/w pain control (avoid opiates), c/w bowel regimen, c/w IS  #UTI: s/p course of ceftriaxone  #Dysphagia: resolved, c/w diet  #GERD: c/w PPI  #Spinal stenosis   #Compression Fx: pain control  #HLD: c/w statin  #HTN: hold anti-BP meds for now given SBP 90s-100s  #Dispo: ERASMO  #DVT ppx: ASA BID 89yo F PMH aortic stenosis, HTN, HLD, GERD, spinal stenosis, multiple syncopal episodes s/p PPM, diverticulosis presenting with L hip pain after mechanical fall, found to have L FNF, also found to have severe AS on echo s/p BAV and L hip ann.     #Severe AS: s/p BAV, maintain euvolemic state, f/u cardiology recs  #Post-op: doing well, c/w pain control (avoid opiates), c/w bowel regimen, c/w IS  #UTI: s/p course of ceftriaxone  #Dysphagia: resolved, c/w diet  #GERD: c/w PPI  #Spinal stenosis   #Compression Fx: pain control  #HLD: c/w statin  #HTN: hold anti-BP meds for now given SBP 90s-100s  #Dispo: ERASMO  #DVT ppx: ASA BID  #Urinary retention: c/w bladder scans, avoid putting a alonoz in if possible, c/w mobilization

## 2021-05-18 NOTE — PROGRESS NOTE ADULT - SUBJECTIVE AND OBJECTIVE BOX
Ortho Note      Subjective:  Pt comfortable without complaints, pain controlled with current pain medication regimen. Left Hip aquacell, c,d,i. Patient with 468cc urine during bladder scan after straight cath 358 was withdrawn.    Denies CP, SOB, N/V, numbness/tingling       Vital Signs Last 24 Hrs  T(C): 36.4 (05-18-21 @ 09:05), Max: 36.4 (05-18-21 @ 09:05)  T(F): 97.5 (05-18-21 @ 09:05), Max: 97.5 (05-18-21 @ 09:05)  HR: 72 (05-18-21 @ 09:05) (72 - 72)  BP: 116/56 (05-18-21 @ 09:05) (116/56 - 116/56)  BP(mean): --  RR: 18 (05-18-21 @ 09:05) (18 - 18)  SpO2: 94% (05-18-21 @ 09:05) (94% - 94%)  AVSS    Objective:    Physical Exam:  General: Pt Alert and oriented, NAD  Left hip dressing, c,d,i  Pulses: +2 pedal pulses, wwp toes, cap refill less than 3 seconds  Sensation: SILT intact  Motor: EHL/FHL/TA/GS- firing        Plan of Care:  A/P: 89yFemale POD#2 s/p Left Hip ann  - afebrile, nontoxic appearance  - Pain Control- tramadol 25 Po Q4h prn moderate pain ,tramadol 50mg PO Q6h prn severe pain, tylenol 1000mg PO Q8h   - DVT ppx: SCDS  - PT, WBS: aspirin 325mg PO BID  - appreciate medicine recs  - appreciate cardiology recs  - bowel regimen, IS use  - bladder scan Q6h,  -dispo: rehab pending auth       Ortho Pager [phone] Ortho Note      Subjective:  Pt comfortable without complaints, pain controlled with current pain medication regimen. Left Hip aquacell, c,d,i. Bladder distended to touch.  Patient with 468cc urine in bladder during bladder scan after straight cath 358cc was withdrawn.    Denies CP, SOB, N/V, numbness/tingling       Vital Signs Last 24 Hrs  T(C): 36.4 (05-18-21 @ 09:05), Max: 36.4 (05-18-21 @ 09:05)  T(F): 97.5 (05-18-21 @ 09:05), Max: 97.5 (05-18-21 @ 09:05)  HR: 72 (05-18-21 @ 09:05) (72 - 72)  BP: 116/56 (05-18-21 @ 09:05) (116/56 - 116/56)  BP(mean): --  RR: 18 (05-18-21 @ 09:05) (18 - 18)  SpO2: 94% (05-18-21 @ 09:05) (94% - 94%)  AVSS    Objective:    Physical Exam:  General: Pt Alert and oriented, NAD  Left hip dressing, c,d,i  Pulses: +2 pedal pulses, wwp toes, cap refill less than 3 seconds  Sensation: SILT intact  Motor: EHL/FHL/TA/GS- firing        Plan of Care:  A/P: 89yFemale POD#2 s/p Left Hip ann  - afebrile, nontoxic appearance  - Pain Control- tramadol 25 Po Q4h prn moderate pain ,tramadol 50mg PO Q6h prn severe pain, tylenol 1000mg PO Q8h   - DVT ppx: SCDS  - PT, WBS: aspirin 325mg PO BID  - appreciate medicine recs  - appreciate cardiology recs  - bowel regimen, IS use  - bladder scan Q6h, straight cath greater than 350cc  -dispo: rehab pending auth       Ortho Pager [phone]

## 2021-05-18 NOTE — DIETITIAN INITIAL EVALUATION ADULT. - OTHER CALCULATIONS
IBW used for calculations as pt >120% of IBW (133%). Needs adjusted for wound healing and advanced age.

## 2021-05-18 NOTE — OCCUPATIONAL THERAPY INITIAL EVALUATION ADULT - LIVES WITH, PROFILE
Pt reports living alone in elevator apartment. Pt reports being able to ambulate with RW at baseline independenlty/alone

## 2021-05-18 NOTE — OCCUPATIONAL THERAPY INITIAL EVALUATION ADULT - PERTINENT HX OF CURRENT PROBLEM, REHAB EVAL
89F [facility] inner ear balance issue, aortic valve stenosis, presenting with L hip pain after mechanical fall earlier today at home. Pt states she lives alone and fell, was finally able to crawl to phone to call for help. Denies head trauma/LOC. Reports pain L hip and R groin but otherwise no pain elsewhere. Denies numbness, tingling LLE. Pre-injury activity limited to mostly in-home activity. Able to walk 2-4 city blocks.. Now S/P L hip Kaveh-Arthoplasty

## 2021-05-18 NOTE — OCCUPATIONAL THERAPY INITIAL EVALUATION ADULT - DIAGNOSIS, OT EVAL
Pt presents with deficits in standing balance, decreased activity tolerance and need for assistance to perform ADL's ad fx mobility with increased safety and independence.

## 2021-05-19 ENCOUNTER — TRANSCRIPTION ENCOUNTER (OUTPATIENT)
Age: 86
End: 2021-05-19

## 2021-05-19 VITALS
DIASTOLIC BLOOD PRESSURE: 62 MMHG | SYSTOLIC BLOOD PRESSURE: 114 MMHG | OXYGEN SATURATION: 97 % | RESPIRATION RATE: 17 BRPM | TEMPERATURE: 98 F | HEART RATE: 75 BPM

## 2021-05-19 LAB — SARS-COV-2 RNA SPEC QL NAA+PROBE: NEGATIVE — SIGNIFICANT CHANGE UP

## 2021-05-19 PROCEDURE — 99233 SBSQ HOSP IP/OBS HIGH 50: CPT

## 2021-05-19 RX ORDER — TRAMADOL HYDROCHLORIDE 50 MG/1
1 TABLET ORAL
Qty: 0 | Refills: 0 | DISCHARGE
Start: 2021-05-19

## 2021-05-19 RX ORDER — AMLODIPINE BESYLATE 2.5 MG/1
0 TABLET ORAL
Qty: 0 | Refills: 0 | DISCHARGE

## 2021-05-19 RX ORDER — SENNA PLUS 8.6 MG/1
2 TABLET ORAL
Qty: 0 | Refills: 0 | DISCHARGE
Start: 2021-05-19

## 2021-05-19 RX ORDER — ASPIRIN/CALCIUM CARB/MAGNESIUM 324 MG
325 TABLET ORAL ONCE
Refills: 0 | Status: COMPLETED | OUTPATIENT
Start: 2021-05-19 | End: 2021-05-19

## 2021-05-19 RX ORDER — POLYETHYLENE GLYCOL 3350 17 G/17G
17 POWDER, FOR SOLUTION ORAL
Qty: 0 | Refills: 0 | DISCHARGE
Start: 2021-05-19

## 2021-05-19 RX ORDER — LOSARTAN POTASSIUM 100 MG/1
0 TABLET, FILM COATED ORAL
Qty: 0 | Refills: 5 | DISCHARGE

## 2021-05-19 RX ORDER — TRAMADOL HYDROCHLORIDE 50 MG/1
0.5 TABLET ORAL
Qty: 0 | Refills: 0 | DISCHARGE
Start: 2021-05-19

## 2021-05-19 RX ORDER — PANTOPRAZOLE SODIUM 20 MG/1
1 TABLET, DELAYED RELEASE ORAL
Qty: 30 | Refills: 0
Start: 2021-05-19 | End: 2021-06-17

## 2021-05-19 RX ORDER — PANTOPRAZOLE SODIUM 20 MG/1
40 TABLET, DELAYED RELEASE ORAL
Qty: 0 | Refills: 0 | DISCHARGE
Start: 2021-05-19

## 2021-05-19 RX ORDER — ASPIRIN/CALCIUM CARB/MAGNESIUM 324 MG
1 TABLET ORAL
Qty: 0 | Refills: 0 | DISCHARGE
Start: 2021-05-19

## 2021-05-19 RX ORDER — ACETAMINOPHEN 500 MG
2 TABLET ORAL
Qty: 0 | Refills: 0 | DISCHARGE
Start: 2021-05-19

## 2021-05-19 RX ADMIN — Medication 1000 MILLIGRAM(S): at 07:15

## 2021-05-19 RX ADMIN — Medication 1000 MILLIGRAM(S): at 06:14

## 2021-05-19 RX ADMIN — Medication 325 MILLIGRAM(S): at 07:35

## 2021-05-19 NOTE — DISCHARGE NOTE PROVIDER - CARE PROVIDER_API CALL
Yonny Nj)  Orthopaedic Surgery Surgery  [address] [address], [address]  [place] [address]  Phone: [phone]  Fax: [phone]  Follow Up Time: 2 weeks    Alekasndr Fry)  Cardiology; Interventional Cardiology  [address], [address] [place] [address]  Phone: [phone]  Fax: [phone]  Follow Up Time: 2 weeks

## 2021-05-19 NOTE — PROGRESS NOTE ADULT - SUBJECTIVE AND OBJECTIVE BOX
Ortho Note    Pt comfortable without complaints, pain controlled  Denies CP, SOB, N/V, numbness/tingling     Vital Signs Last 24 Hrs  T(C): 36.4 (05-19-21 @ 09:10), Max: 36.4 (05-19-21 @ 09:10)  T(F): 97.5 (05-19-21 @ 09:10), Max: 97.5 (05-19-21 @ 09:10)  HR: 75 (05-19-21 @ 09:10) (75 - 75)  BP: 114/62 (05-19-21 @ 09:10) (114/62 - 114/62)  BP(mean): --  RR: 17 (05-19-21 @ 09:10) (17 - 17)  SpO2: 97% (05-19-21 @ 09:10) (97% - 97%)  AVSS    General: Pt Alert and oriented, NAD  DSG: left hip aquacel C/D/I  Pulses: bilateral pedal 2+< wwp toes, cap refill <3sec toes  Sensation: bilateral SILT  Motor: bilateral 5/5 EHL/FHL/TA/GS    05-18    140  |  106  |  23  ----------------------------<  121<H>  4.1   |  28  |  0.58    Ca    8.2<L>      18 May 2021 08:12                            9.3    11.29 )-----------( 200      ( 18 May 2021 08:12 )             28.6       A/P: 89y Female POD#3 s/p left hemiarthroplasty  - Stable, afebrile COVID negative, IS encouraged, nontoxic appearance  - Pain Control: po meds  - DVT ppx: ASA 325mg BID  - PT, WBS: WBAT  LLE  -Bowel regimen: continue bowel regimen  -Dispo: rehab today    Ortho Pager [phone]

## 2021-05-19 NOTE — DISCHARGE NOTE PROVIDER - HOSPITAL COURSE
Admitted 5/14/21  Surgery 5/14/21 CAV balloon valvuloplasty (for aortic stenosis), and 5/16/21 left hip hemiarthroplasty  Richelle-op Antibiotics  Pain control  DVT prophylaxis  OOB/Physical Therapy

## 2021-05-19 NOTE — DISCHARGE NOTE NURSING/CASE MANAGEMENT/SOCIAL WORK - NSDPFAC_GEN_ALL_CORE
[facility]/ [address] [facility] [place] [address]/ Main Phone: [phone]

## 2021-05-19 NOTE — DISCHARGE NOTE PROVIDER - PROVIDER TOKENS
PROVIDER:[TOKEN:[[id number]],FOLLOWUP:[2 weeks]],PROVIDER:[TOKEN:[[id number]],FOLLOWUP:[2 weeks]]

## 2021-05-19 NOTE — DISCHARGE NOTE PROVIDER - NSDCCPTREATMENT_GEN_ALL_CORE_FT
PRINCIPAL PROCEDURE  Procedure: Hemiarthroplasty of left hip  Findings and Treatment:       SECONDARY PROCEDURE  Procedure: Balloon valvuloplasty, aortic  Findings and Treatment:

## 2021-05-19 NOTE — DISCHARGE NOTE PROVIDER - NSDCCPCAREPLAN_GEN_ALL_CORE_FT
PRINCIPAL DISCHARGE DIAGNOSIS  Diagnosis: Closed fracture of left hip, initial encounter  Assessment and Plan of Treatment: left hemiarthroplasty      SECONDARY DISCHARGE DIAGNOSES  Diagnosis: Aortic valve stenosis, etiology of cardiac valve disease unspecified  Assessment and Plan of Treatment: Aortic valve stenosis, etiology of cardiac valve disease unspecified    Diagnosis: Fall  Assessment and Plan of Treatment:

## 2021-05-19 NOTE — DISCHARGE NOTE PROVIDER - NSDCMRMEDTOKEN_GEN_ALL_CORE_FT
acetaminophen 500 mg oral tablet: 2 tab(s) orally every 8 hours, As Needed for mild pain or fever 100. 3 or higher  DO not exceed 3000mg/day  AMLODIPINE BESYLATE 5MG TABLETS: TAKE 1 TABLET BY MOUTH DAILY  Hold if SBP &lt;90/50  aspirin 325 mg oral delayed release tablet: 1 tab(s) orally 2 times a day  ATORVASTATIN 10MG TABLETS: TAKE 1 TABLET BY MOUTH DAILY  polyethylene glycol 3350 oral powder for reconstitution: 17 gram(s) orally once a day (at bedtime)  senna oral tablet: 2 tab(s) orally once a day (at bedtime)  traMADol 50 mg oral tablet: 1 tab(s) orally every 4 hours, As needed, Severe Pain (7 - 10)  traMADol 50 mg oral tablet: 0.5 tab(s) orally every 6 hours, As needed, Moderate Pain (4 - 6)  VITAMIN D2 24907KA (ERGO) CAP RX: TAKE 1 CAPSULE BY MOUTH EVERY 30 DAYS

## 2021-05-19 NOTE — PROGRESS NOTE ADULT - ASSESSMENT
89yo F PMH aortic stenosis, HTN, HLD, GERD, spinal stenosis, multiple syncopal episodes s/p PPM, diverticulosis presenting with L hip pain after mechanical fall, found to have L FNF, also found to have severe AS on echo s/p BAV and L hip ann.     #Severe AS: s/p BAV, maintain euvolemic state, f/u cardiology recs  #Post-op: doing well, c/w pain control (avoid opiates), c/w bowel regimen, c/w IS  #UTI: s/p course of ceftriaxone  #Dysphagia: resolved, c/w diet  #GERD: c/w PPI  #Spinal stenosis   #Compression Fx: pain control  #HLD: c/w statin  #HTN: hold anti-BP meds ; bp stable  #Dispo: ERASMO  #DVT ppx: ASA BID  #Urinary retention: resolved; patient voiding

## 2021-05-19 NOTE — PROGRESS NOTE ADULT - SUBJECTIVE AND OBJECTIVE BOX
OVERNIGHT EVENTS: Urinary retention.     SUBJECTIVE / INTERVAL HPI: Patient seen and examined at bedside. No complaints, says she is breathing comfortably and no concerns/complaints today.     Vital Signs Last 24 Hrs  T(C): 36.4 (19 May 2021 09:10), Max: 36.6 (18 May 2021 16:05)  T(F): 97.5 (19 May 2021 09:10), Max: 97.8 (18 May 2021 16:05)  HR: 75 (19 May 2021 09:10) (62 - 80)  BP: 114/62 (19 May 2021 09:10) (103/58 - 130/58)  BP(mean): --  RR: 17 (19 May 2021 09:10) (16 - 18)  SpO2: 97% (19 May 2021 09:10) (93% - 97%)    PHYSICAL EXAM:    General: WDWN, pleasant, sharp, awake and alert  HEENT: NC/AT;   Neck: supple  Cardiovascular: +S1/S2; RRR, crescendo decrescendo systolic murmur  Respiratory: clear, no wheezes/rhonchi/rales, no accessory muscle use  Gastrointestinal: soft, NT/ND;   Extremities: WWP; dressing c/d/i,     MEDICATIONS  (STANDING):  acetaminophen   Tablet .. 1000 milliGRAM(s) Oral every 8 hours  aspirin enteric coated 325 milliGRAM(s) Oral two times a day  atorvastatin 10 milliGRAM(s) Oral at bedtime  BUpivacaine liposome 1.3% Injectable (no eMAR) 20 milliLiter(s) Local Injection once  HYDROmorphone  Injectable 0.5 milliGRAM(s) IV Push once  pantoprazole  Injectable 40 milliGRAM(s) IV Push daily  polyethylene glycol 3350 17 Gram(s) Oral at bedtime  povidone iodine 5% Nasal Swab 1 Application(s) Both Nostrils once  senna 2 Tablet(s) Oral at bedtime    MEDICATIONS  (PRN):  bisacodyl Suppository 10 milliGRAM(s) Rectal daily PRN Constipation  magnesium hydroxide Suspension 30 milliLiter(s) Oral daily PRN Constipation  ondansetron Injectable 4 milliGRAM(s) IV Push every 6 hours PRN Nausea and/or Vomiting  traMADol 50 milliGRAM(s) Oral every 4 hours PRN Severe Pain (7 - 10)  traMADol 25 milliGRAM(s) Oral every 6 hours PRN Moderate Pain (4 - 6)        ALLERGIES:  Allergies    amoxicillin (Unknown)  ciprofloxacin (Unknown)  metronidazole (Unknown)    Intolerances        LABS:             no labs today          CAPILLARY BLOOD GLUCOSE          RADIOLOGY & ADDITIONAL TESTS: Reviewed.

## 2021-05-19 NOTE — PROGRESS NOTE ADULT - NSICDXPILOT_GEN_ALL_CORE
[place]

## 2021-05-19 NOTE — DISCHARGE NOTE PROVIDER - NSDCFUADDINST_GEN_ALL_CORE_FT
No strenuous activity, heavy lifting, driving or returning to work until cleared by MD.  You may shower - dressing is water-resistant, no soaking in bathtubs.  Dressing to be removed after post op day 5-7, then can leave incision open to air. Keep incision clean and dry.  Any staples/sutures should be removed in 10-14 days.  Try to have regular bowel movements, take stool softener or laxative if necessary.  Swelling may travel all the way down leg to foot, this is normal and will subside in a few weeks.  Call to schedule an appt with Dr. Nj for follow up after discharge, usually 2-3 weeks postop.  Contact doctor if you experience: fever greater than 101.5, chills, chest pain, difficulty breathing, redness or excessive drainage around the incision, other concerns.  Follow up with primary care provider.  Follow up with cardiologist in 2 weeks s/p balloon valvuloplasty, patient came with severe aortic stenosis.   Patient is [place], please speak direct.

## 2021-05-19 NOTE — PROGRESS NOTE ADULT - PROVIDER SPECIALTY LIST ADULT
Hospitalist
Orthopedics
Structural Heart
Cardiology
Hospitalist
Structural Heart
Cardiology
Hospitalist
Hospitalist
Structural Heart
Statement Selected

## 2021-05-19 NOTE — PROGRESS NOTE ADULT - TIME BILLING
Hospital Medicine  Counseling provided with time spent for UTI, severe AS  Time spent discussing care with primary team.
Hospital medicine  Counseling provided with time spent for DVT prophylaxis, AS, anemia, and medication education.  Time spent discussing care with primary team.
Hospital medicine  Counseling provided with time spent for DVT prophylaxis, AS, anemia, and medication education.  Time spent discussing care with primary team.
Hospital medicine  Counseling provided with time spent for dysphagia, DVT prophylaxis, anemia, and medication education.  Time spent discussing care with primary team.
as noted above

## 2021-05-20 PROCEDURE — 97161 PT EVAL LOW COMPLEX 20 MIN: CPT

## 2021-05-20 PROCEDURE — 87186 SC STD MICRODIL/AGAR DIL: CPT

## 2021-05-20 PROCEDURE — 82962 GLUCOSE BLOOD TEST: CPT

## 2021-05-20 PROCEDURE — 84295 ASSAY OF SERUM SODIUM: CPT

## 2021-05-20 PROCEDURE — U0003: CPT

## 2021-05-20 PROCEDURE — 82550 ASSAY OF CK (CPK): CPT

## 2021-05-20 PROCEDURE — 84132 ASSAY OF SERUM POTASSIUM: CPT

## 2021-05-20 PROCEDURE — 99285 EMERGENCY DEPT VISIT HI MDM: CPT | Mod: 25

## 2021-05-20 PROCEDURE — 73552 X-RAY EXAM OF FEMUR 2/>: CPT

## 2021-05-20 PROCEDURE — 82553 CREATINE MB FRACTION: CPT

## 2021-05-20 PROCEDURE — 72125 CT NECK SPINE W/O DYE: CPT

## 2021-05-20 PROCEDURE — 81001 URINALYSIS AUTO W/SCOPE: CPT

## 2021-05-20 PROCEDURE — 87086 URINE CULTURE/COLONY COUNT: CPT

## 2021-05-20 PROCEDURE — 73521 X-RAY EXAM HIPS BI 2 VIEWS: CPT

## 2021-05-20 PROCEDURE — 70450 CT HEAD/BRAIN W/O DYE: CPT

## 2021-05-20 PROCEDURE — U0005: CPT

## 2021-05-20 PROCEDURE — 86901 BLOOD TYPING SEROLOGIC RH(D): CPT

## 2021-05-20 PROCEDURE — 86900 BLOOD TYPING SEROLOGIC ABO: CPT

## 2021-05-20 PROCEDURE — 86850 RBC ANTIBODY SCREEN: CPT

## 2021-05-20 PROCEDURE — 93308 TTE F-UP OR LMTD: CPT

## 2021-05-20 PROCEDURE — C1776: CPT

## 2021-05-20 PROCEDURE — 93306 TTE W/DOPPLER COMPLETE: CPT

## 2021-05-20 PROCEDURE — 87635 SARS-COV-2 COVID-19 AMP PRB: CPT

## 2021-05-20 PROCEDURE — 83735 ASSAY OF MAGNESIUM: CPT

## 2021-05-20 PROCEDURE — 85027 COMPLETE CBC AUTOMATED: CPT

## 2021-05-20 PROCEDURE — 85610 PROTHROMBIN TIME: CPT

## 2021-05-20 PROCEDURE — 86769 SARS-COV-2 COVID-19 ANTIBODY: CPT

## 2021-05-20 PROCEDURE — 72170 X-RAY EXAM OF PELVIS: CPT

## 2021-05-20 PROCEDURE — 72100 X-RAY EXAM L-S SPINE 2/3 VWS: CPT

## 2021-05-20 PROCEDURE — 71250 CT THORAX DX C-: CPT

## 2021-05-20 PROCEDURE — 84100 ASSAY OF PHOSPHORUS: CPT

## 2021-05-20 PROCEDURE — 80053 COMPREHEN METABOLIC PANEL: CPT

## 2021-05-20 PROCEDURE — 84484 ASSAY OF TROPONIN QUANT: CPT

## 2021-05-20 PROCEDURE — 97535 SELF CARE MNGMENT TRAINING: CPT

## 2021-05-20 PROCEDURE — 80048 BASIC METABOLIC PNL TOTAL CA: CPT

## 2021-05-20 PROCEDURE — 83690 ASSAY OF LIPASE: CPT

## 2021-05-20 PROCEDURE — 85730 THROMBOPLASTIN TIME PARTIAL: CPT

## 2021-05-20 PROCEDURE — 36415 COLL VENOUS BLD VENIPUNCTURE: CPT

## 2021-05-20 PROCEDURE — 71045 X-RAY EXAM CHEST 1 VIEW: CPT

## 2021-05-20 PROCEDURE — 96374 THER/PROPH/DIAG INJ IV PUSH: CPT

## 2021-05-20 PROCEDURE — 85025 COMPLETE CBC W/AUTO DIFF WBC: CPT

## 2021-05-20 PROCEDURE — 93005 ELECTROCARDIOGRAM TRACING: CPT

## 2021-05-20 PROCEDURE — 97116 GAIT TRAINING THERAPY: CPT

## 2021-05-20 PROCEDURE — 83880 ASSAY OF NATRIURETIC PEPTIDE: CPT

## 2021-05-20 PROCEDURE — 82330 ASSAY OF CALCIUM: CPT

## 2021-05-20 PROCEDURE — 82803 BLOOD GASES ANY COMBINATION: CPT

## 2021-05-20 PROCEDURE — 86923 COMPATIBILITY TEST ELECTRIC: CPT

## 2021-05-27 DIAGNOSIS — R13.10 DYSPHAGIA, UNSPECIFIED: ICD-10-CM

## 2021-05-27 DIAGNOSIS — I11.0 HYPERTENSIVE HEART DISEASE WITH HEART FAILURE: ICD-10-CM

## 2021-05-27 DIAGNOSIS — R33.9 RETENTION OF URINE, UNSPECIFIED: ICD-10-CM

## 2021-05-27 DIAGNOSIS — I50.33 ACUTE ON CHRONIC DIASTOLIC (CONGESTIVE) HEART FAILURE: ICD-10-CM

## 2021-05-27 DIAGNOSIS — Z95.0 PRESENCE OF CARDIAC PACEMAKER: ICD-10-CM

## 2021-05-27 DIAGNOSIS — T79.6XXA TRAUMATIC ISCHEMIA OF MUSCLE, INITIAL ENCOUNTER: ICD-10-CM

## 2021-05-27 DIAGNOSIS — S72.002A FRACTURE OF UNSPECIFIED PART OF NECK OF LEFT FEMUR, INITIAL ENCOUNTER FOR CLOSED FRACTURE: ICD-10-CM

## 2021-05-27 DIAGNOSIS — K57.90 DIVERTICULOSIS OF INTESTINE, PART UNSPECIFIED, WITHOUT PERFORATION OR ABSCESS WITHOUT BLEEDING: ICD-10-CM

## 2021-05-27 DIAGNOSIS — K21.9 GASTRO-ESOPHAGEAL REFLUX DISEASE WITHOUT ESOPHAGITIS: ICD-10-CM

## 2021-05-27 DIAGNOSIS — Z88.1 ALLERGY STATUS TO OTHER ANTIBIOTIC AGENTS STATUS: ICD-10-CM

## 2021-05-27 DIAGNOSIS — N39.0 URINARY TRACT INFECTION, SITE NOT SPECIFIED: ICD-10-CM

## 2021-05-27 DIAGNOSIS — I35.0 NONRHEUMATIC AORTIC (VALVE) STENOSIS: ICD-10-CM
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
## 2021-05-27 DIAGNOSIS — E78.5 HYPERLIPIDEMIA, UNSPECIFIED: ICD-10-CM

## 2021-05-27 DIAGNOSIS — J96.01 ACUTE RESPIRATORY FAILURE WITH HYPOXIA: ICD-10-CM

## 2021-08-11 PROBLEM — Z78.9 OTHER SPECIFIED HEALTH STATUS: Chronic | Status: ACTIVE | Noted: 2021-05-13

## 2021-09-10 ENCOUNTER — APPOINTMENT (OUTPATIENT)
Dept: CARDIOTHORACIC SURGERY | Facility: CLINIC | Age: 86
End: 2021-09-10
Payer: MEDICARE

## 2021-09-10 ENCOUNTER — OUTPATIENT (OUTPATIENT)
Dept: OUTPATIENT SERVICES | Facility: HOSPITAL | Age: 86
LOS: 1 days | End: 2021-09-10
Payer: MEDICARE

## 2021-09-10 DIAGNOSIS — I35.0 NONRHEUMATIC AORTIC (VALVE) STENOSIS: ICD-10-CM

## 2021-09-10 LAB
ALBUMIN SERPL ELPH-MCNC: 3.5 G/DL — SIGNIFICANT CHANGE UP (ref 3.3–5)
ALP SERPL-CCNC: 104 U/L — SIGNIFICANT CHANGE UP (ref 40–120)
ALT FLD-CCNC: 6 U/L — LOW (ref 10–45)
ANION GAP SERPL CALC-SCNC: 10 MMOL/L — SIGNIFICANT CHANGE UP (ref 5–17)
ANISOCYTOSIS BLD QL: SLIGHT — SIGNIFICANT CHANGE UP
APTT BLD: 29.6 SEC — SIGNIFICANT CHANGE UP (ref 27.5–35.5)
AST SERPL-CCNC: 20 U/L — SIGNIFICANT CHANGE UP (ref 10–40)
BASOPHILS # BLD AUTO: 0.2 K/UL — SIGNIFICANT CHANGE UP (ref 0–0.2)
BASOPHILS NFR BLD AUTO: 1.8 % — SIGNIFICANT CHANGE UP (ref 0–2)
BILIRUB SERPL-MCNC: 0.6 MG/DL — SIGNIFICANT CHANGE UP (ref 0.2–1.2)
BUN SERPL-MCNC: 8 MG/DL — SIGNIFICANT CHANGE UP (ref 7–23)
CALCIUM SERPL-MCNC: 9.6 MG/DL — SIGNIFICANT CHANGE UP (ref 8.4–10.5)
CHLORIDE SERPL-SCNC: 104 MMOL/L — SIGNIFICANT CHANGE UP (ref 96–108)
CO2 SERPL-SCNC: 28 MMOL/L — SIGNIFICANT CHANGE UP (ref 22–31)
CREAT SERPL-MCNC: 0.58 MG/DL — SIGNIFICANT CHANGE UP (ref 0.5–1.3)
DACRYOCYTES BLD QL SMEAR: SLIGHT — SIGNIFICANT CHANGE UP
EOSINOPHIL # BLD AUTO: 0.29 K/UL — SIGNIFICANT CHANGE UP (ref 0–0.5)
EOSINOPHIL NFR BLD AUTO: 2.7 % — SIGNIFICANT CHANGE UP (ref 0–6)
GLUCOSE SERPL-MCNC: 72 MG/DL — SIGNIFICANT CHANGE UP (ref 70–99)
HCT VFR BLD CALC: 43.4 % — SIGNIFICANT CHANGE UP (ref 34.5–45)
HGB BLD-MCNC: 13.8 G/DL — SIGNIFICANT CHANGE UP (ref 11.5–15.5)
INR BLD: 0.88 — SIGNIFICANT CHANGE UP (ref 0.88–1.16)
LYMPHOCYTES # BLD AUTO: 1.66 K/UL — SIGNIFICANT CHANGE UP (ref 1–3.3)
LYMPHOCYTES # BLD AUTO: 15.2 % — SIGNIFICANT CHANGE UP (ref 13–44)
MACROCYTES BLD QL: SLIGHT — SIGNIFICANT CHANGE UP
MANUAL SMEAR VERIFICATION: SIGNIFICANT CHANGE UP
MCHC RBC-ENTMCNC: 29.5 PG — SIGNIFICANT CHANGE UP (ref 27–34)
MCHC RBC-ENTMCNC: 31.8 GM/DL — LOW (ref 32–36)
MCV RBC AUTO: 92.7 FL — SIGNIFICANT CHANGE UP (ref 80–100)
MONOCYTES # BLD AUTO: 0.39 K/UL — SIGNIFICANT CHANGE UP (ref 0–0.9)
MONOCYTES NFR BLD AUTO: 3.6 % — SIGNIFICANT CHANGE UP (ref 2–14)
NEUTROPHILS # BLD AUTO: 7.78 K/UL — HIGH (ref 1.8–7.4)
NEUTROPHILS NFR BLD AUTO: 71.4 % — SIGNIFICANT CHANGE UP (ref 43–77)
OVALOCYTES BLD QL SMEAR: SLIGHT — SIGNIFICANT CHANGE UP
PLAT MORPH BLD: NORMAL — SIGNIFICANT CHANGE UP
PLATELET # BLD AUTO: 311 K/UL — SIGNIFICANT CHANGE UP (ref 150–400)
POIKILOCYTOSIS BLD QL AUTO: SLIGHT — SIGNIFICANT CHANGE UP
POLYCHROMASIA BLD QL SMEAR: SLIGHT — SIGNIFICANT CHANGE UP
POTASSIUM SERPL-MCNC: 4.6 MMOL/L — SIGNIFICANT CHANGE UP (ref 3.5–5.3)
POTASSIUM SERPL-SCNC: 4.6 MMOL/L — SIGNIFICANT CHANGE UP (ref 3.5–5.3)
PROT SERPL-MCNC: 6.8 G/DL — SIGNIFICANT CHANGE UP (ref 6–8.3)
PROTHROM AB SERPL-ACNC: 10.6 SEC — SIGNIFICANT CHANGE UP (ref 10.6–13.6)
RBC # BLD: 4.68 M/UL — SIGNIFICANT CHANGE UP (ref 3.8–5.2)
RBC # FLD: 20.3 % — HIGH (ref 10.3–14.5)
RBC BLD AUTO: ABNORMAL
SCHISTOCYTES BLD QL AUTO: SLIGHT — SIGNIFICANT CHANGE UP
SMUDGE CELLS # BLD: PRESENT — SIGNIFICANT CHANGE UP
SODIUM SERPL-SCNC: 142 MMOL/L — SIGNIFICANT CHANGE UP (ref 135–145)
VARIANT LYMPHS # BLD: 5.3 % — SIGNIFICANT CHANGE UP (ref 0–6)
WBC # BLD: 10.89 K/UL — HIGH (ref 3.8–10.5)
WBC # FLD AUTO: 10.89 K/UL — HIGH (ref 3.8–10.5)

## 2021-09-10 PROCEDURE — 86850 RBC ANTIBODY SCREEN: CPT

## 2021-09-10 PROCEDURE — 85730 THROMBOPLASTIN TIME PARTIAL: CPT

## 2021-09-10 PROCEDURE — 36415 COLL VENOUS BLD VENIPUNCTURE: CPT

## 2021-09-10 PROCEDURE — 85025 COMPLETE CBC W/AUTO DIFF WBC: CPT

## 2021-09-10 PROCEDURE — 85610 PROTHROMBIN TIME: CPT

## 2021-09-10 PROCEDURE — 86900 BLOOD TYPING SEROLOGIC ABO: CPT

## 2021-09-10 PROCEDURE — 86901 BLOOD TYPING SEROLOGIC RH(D): CPT

## 2021-09-10 PROCEDURE — 80053 COMPREHEN METABOLIC PANEL: CPT

## 2021-09-12 ENCOUNTER — FORM ENCOUNTER (OUTPATIENT)
Age: 86
End: 2021-09-12

## 2021-09-13 ENCOUNTER — OUTPATIENT (OUTPATIENT)
Dept: OUTPATIENT SERVICES | Facility: HOSPITAL | Age: 86
LOS: 1 days | End: 2021-09-13
Payer: MEDICARE

## 2021-09-13 ENCOUNTER — APPOINTMENT (OUTPATIENT)
Dept: CT IMAGING | Facility: HOSPITAL | Age: 86
End: 2021-09-13
Payer: MEDICARE

## 2021-09-13 ENCOUNTER — APPOINTMENT (OUTPATIENT)
Dept: CARDIOTHORACIC SURGERY | Facility: CLINIC | Age: 86
End: 2021-09-13
Payer: MEDICARE

## 2021-09-13 DIAGNOSIS — Z86.79 PERSONAL HISTORY OF OTHER DISEASES OF THE CIRCULATORY SYSTEM: ICD-10-CM

## 2021-09-13 DIAGNOSIS — Z86.39 PERSONAL HISTORY OF OTHER ENDOCRINE, NUTRITIONAL AND METABOLIC DISEASE: ICD-10-CM

## 2021-09-13 DIAGNOSIS — Z98.890 OTHER SPECIFIED POSTPROCEDURAL STATES: ICD-10-CM

## 2021-09-13 DIAGNOSIS — Z78.9 OTHER SPECIFIED HEALTH STATUS: ICD-10-CM

## 2021-09-13 DIAGNOSIS — I35.0 NONRHEUMATIC AORTIC (VALVE) STENOSIS: ICD-10-CM

## 2021-09-13 PROCEDURE — 93306 TTE W/DOPPLER COMPLETE: CPT

## 2021-09-13 PROCEDURE — 93306 TTE W/DOPPLER COMPLETE: CPT | Mod: 26

## 2021-09-13 PROCEDURE — 99215 OFFICE O/P EST HI 40 MIN: CPT

## 2021-09-15 VITALS — SYSTOLIC BLOOD PRESSURE: 142 MMHG | DIASTOLIC BLOOD PRESSURE: 63 MMHG | HEART RATE: 81 BPM | OXYGEN SATURATION: 96 %

## 2021-09-15 PROBLEM — Z98.890: Status: RESOLVED | Noted: 2021-09-15 | Resolved: 2021-09-15

## 2021-09-15 PROBLEM — Z86.79 HISTORY OF HYPERTENSION: Status: RESOLVED | Noted: 2021-09-15 | Resolved: 2021-09-15

## 2021-09-15 PROBLEM — Z86.39 HISTORY OF HYPERLIPIDEMIA: Status: RESOLVED | Noted: 2021-09-15 | Resolved: 2021-09-15

## 2021-09-15 PROBLEM — Z78.9 NON-SMOKER: Status: ACTIVE | Noted: 2021-09-15

## 2021-09-22 NOTE — REVIEW OF SYSTEMS
[Feeling Fatigued] : feeling fatigued [Lower Ext Edema] : lower extremity edema [Joint Pain] : joint pain [Joint Stiffness] : joint stiffness [Negative] : Psychiatric [Fever] : no fever [Headache] : no headache [Chills] : no chills [SOB] : no shortness of breath [Dyspnea on exertion] : not dyspnea during exertion [Chest Discomfort] : no chest discomfort [Leg Claudication] : no intermittent leg claudication [Palpitations] : no palpitations [Orthopnea] : no orthopnea [PND] : no PND [Syncope] : no syncope [Joint Swelling] : no joint swelling [Muscle Cramps] : no muscle cramps [Myalgia] : no myalgia

## 2021-09-22 NOTE — PHYSICAL EXAM
[Frail] : frail [Cachectic] : cachexia was observed [Normal] : alert and oriented, normal memory [de-identified] : 2+ pitting edema to bilateral lower extermities.

## 2021-09-22 NOTE — HISTORY OF PRESENT ILLNESS
[FreeTextEntry1] : \par 89 year old female with a past medical history of hypertension, PPM, left hip fracture s/p left hemiarthroplasty on 5/14/21 with subsequent dislocation (now wheelchair bound) and chronic diastolic heart failure with severe aortic stenosis s/p BAV on 5/14/2021 who presents for follow up.  \par \par The patient had a mechanical fall in May 2021 which led to a left femoral hip fracture. While in the hospital, the patient found to have severe aortic stenosis. The patient had a BAV on 5/14/2021 followed by a left hip hemiarthroplasty on 5/15/2021. She was discharged to SNF. While there, she continued to experience left hip pain and was found to have a dislocation of the prosthesis. Per the family, she was told by the orthopedic surgeon that she would be very high risk for a reoperation to fix the hip. Since then, she remains mostly in a wheelchair and dose not walk as any weight bearing is painful. She was discharged from SNF at the beginning of August and has a HHA 24/7 at home.\par \par Currently, the patient reports no chest pain or SOB, at rest and with exertion. She also denies orthopnea, PND, dizziness, syncope and palpitations. She does report an increase in LE edema over the past several weeks. \par \par

## 2021-10-01 ENCOUNTER — APPOINTMENT (OUTPATIENT)
Dept: ULTRASOUND IMAGING | Facility: HOSPITAL | Age: 86
End: 2021-10-01
Payer: MEDICARE

## 2021-10-01 ENCOUNTER — NON-APPOINTMENT (OUTPATIENT)
Age: 86
End: 2021-10-01

## 2021-10-01 ENCOUNTER — OUTPATIENT (OUTPATIENT)
Dept: OUTPATIENT SERVICES | Facility: HOSPITAL | Age: 86
LOS: 1 days | End: 2021-10-01
Payer: MEDICARE

## 2021-10-01 ENCOUNTER — APPOINTMENT (OUTPATIENT)
Dept: HEART AND VASCULAR | Facility: CLINIC | Age: 86
End: 2021-10-01
Payer: MEDICARE

## 2021-10-01 VITALS
HEART RATE: 76 BPM | SYSTOLIC BLOOD PRESSURE: 130 MMHG | OXYGEN SATURATION: 96 % | DIASTOLIC BLOOD PRESSURE: 70 MMHG | TEMPERATURE: 96.1 F

## 2021-10-01 DIAGNOSIS — Z01.818 ENCOUNTER FOR OTHER PREPROCEDURAL EXAMINATION: ICD-10-CM

## 2021-10-01 LAB
ANION GAP SERPL CALC-SCNC: 8 MMOL/L — SIGNIFICANT CHANGE UP (ref 5–17)
BASOPHILS # BLD AUTO: 0.07 K/UL — SIGNIFICANT CHANGE UP (ref 0–0.2)
BASOPHILS NFR BLD AUTO: 0.7 % — SIGNIFICANT CHANGE UP (ref 0–2)
BLD GP AB SCN SERPL QL: NEGATIVE — SIGNIFICANT CHANGE UP
BUN SERPL-MCNC: 9 MG/DL — SIGNIFICANT CHANGE UP (ref 7–23)
CALCIUM SERPL-MCNC: 9.5 MG/DL — SIGNIFICANT CHANGE UP (ref 8.4–10.5)
CHLORIDE SERPL-SCNC: 101 MMOL/L — SIGNIFICANT CHANGE UP (ref 96–108)
CO2 SERPL-SCNC: 32 MMOL/L — HIGH (ref 22–31)
CREAT SERPL-MCNC: 0.59 MG/DL — SIGNIFICANT CHANGE UP (ref 0.5–1.3)
EOSINOPHIL # BLD AUTO: 0.11 K/UL — SIGNIFICANT CHANGE UP (ref 0–0.5)
EOSINOPHIL NFR BLD AUTO: 1.2 % — SIGNIFICANT CHANGE UP (ref 0–6)
GLUCOSE SERPL-MCNC: 86 MG/DL — SIGNIFICANT CHANGE UP (ref 70–99)
HCT VFR BLD CALC: 44.8 % — SIGNIFICANT CHANGE UP (ref 34.5–45)
HGB BLD-MCNC: 13.7 G/DL — SIGNIFICANT CHANGE UP (ref 11.5–15.5)
IMM GRANULOCYTES NFR BLD AUTO: 0.3 % — SIGNIFICANT CHANGE UP (ref 0–1.5)
INR BLD: 0.87 — LOW (ref 0.88–1.16)
LYMPHOCYTES # BLD AUTO: 3.62 K/UL — HIGH (ref 1–3.3)
LYMPHOCYTES # BLD AUTO: 38.1 % — SIGNIFICANT CHANGE UP (ref 13–44)
MCHC RBC-ENTMCNC: 28.5 PG — SIGNIFICANT CHANGE UP (ref 27–34)
MCHC RBC-ENTMCNC: 30.6 GM/DL — LOW (ref 32–36)
MCV RBC AUTO: 93.1 FL — SIGNIFICANT CHANGE UP (ref 80–100)
MONOCYTES # BLD AUTO: 0.57 K/UL — SIGNIFICANT CHANGE UP (ref 0–0.9)
MONOCYTES NFR BLD AUTO: 6 % — SIGNIFICANT CHANGE UP (ref 2–14)
NEUTROPHILS # BLD AUTO: 5.1 K/UL — SIGNIFICANT CHANGE UP (ref 1.8–7.4)
NEUTROPHILS NFR BLD AUTO: 53.7 % — SIGNIFICANT CHANGE UP (ref 43–77)
NRBC # BLD: 0 /100 WBCS — SIGNIFICANT CHANGE UP (ref 0–0)
PLATELET # BLD AUTO: 320 K/UL — SIGNIFICANT CHANGE UP (ref 150–400)
POTASSIUM SERPL-MCNC: 4.5 MMOL/L — SIGNIFICANT CHANGE UP (ref 3.5–5.3)
POTASSIUM SERPL-SCNC: 4.5 MMOL/L — SIGNIFICANT CHANGE UP (ref 3.5–5.3)
PROTHROM AB SERPL-ACNC: 10.5 SEC — LOW (ref 10.6–13.6)
RBC # BLD: 4.81 M/UL — SIGNIFICANT CHANGE UP (ref 3.8–5.2)
RBC # FLD: 17.3 % — HIGH (ref 10.3–14.5)
RH IG SCN BLD-IMP: NEGATIVE — SIGNIFICANT CHANGE UP
SODIUM SERPL-SCNC: 141 MMOL/L — SIGNIFICANT CHANGE UP (ref 135–145)
WBC # BLD: 9.5 K/UL — SIGNIFICANT CHANGE UP (ref 3.8–10.5)
WBC # FLD AUTO: 9.5 K/UL — SIGNIFICANT CHANGE UP (ref 3.8–10.5)

## 2021-10-01 PROCEDURE — 93000 ELECTROCARDIOGRAM COMPLETE: CPT

## 2021-10-01 PROCEDURE — 93970 EXTREMITY STUDY: CPT | Mod: 26

## 2021-10-01 PROCEDURE — 93970 EXTREMITY STUDY: CPT

## 2021-10-01 PROCEDURE — 85025 COMPLETE CBC W/AUTO DIFF WBC: CPT

## 2021-10-01 PROCEDURE — 99214 OFFICE O/P EST MOD 30 MIN: CPT

## 2021-10-01 PROCEDURE — 85610 PROTHROMBIN TIME: CPT

## 2021-10-01 PROCEDURE — 86900 BLOOD TYPING SEROLOGIC ABO: CPT

## 2021-10-01 PROCEDURE — 86901 BLOOD TYPING SEROLOGIC RH(D): CPT

## 2021-10-01 PROCEDURE — 86850 RBC ANTIBODY SCREEN: CPT

## 2021-10-01 PROCEDURE — 99214 OFFICE O/P EST MOD 30 MIN: CPT | Mod: 25

## 2021-10-01 PROCEDURE — 80048 BASIC METABOLIC PNL TOTAL CA: CPT

## 2021-10-01 NOTE — CARDIOLOGY SUMMARY
[de-identified] : 10/1/21 : nsr, first degree AV block, poor R wave progression [de-identified] : 5/14/21: Severe AS, PV 4m/s, mean: 36 mmHg, SONYA ).67 cm2, DVI: 0.26, Gr 1 diastolic dysfunction, Normal Biv fxn., mild MSD, 3 mm\par 3 mm Hg at 64 bpm, PASP 39 mmHg, no pericardial effusion.\par 9/13/21: Moderate AS, PV 3.2 m/s, mean 23 mmHg, SONYA 1 cm2, Gr 1 diastolic dysfunction, Normal Biv Fxn, PASP 37 mmHg.\par

## 2021-10-01 NOTE — ASSESSMENT
[FreeTextEntry1] : 89 yo F with PMH of HTN, PPM, left hip fracture s/p left hemiarthroplasty on 5/14/21 with subsequent dislocation (now wheelchair bound) , chronic HFpEF, severe aortic stenosis s/p BAV 5/14/2021 who presents for follow up.  \par \par 1. Severe AS s/p BAV 5/14/21: SONYA 1 cm2, peak velocity 3.2 m/s, mean gradient 23 mm Hg. Saw [facility] team in Sept 2021- Not a candidate of TAVR at this time ( wheelchair bound). Cont to monitor s/s like CP, SOB, dizziness. She is currently satisfied with her quality of life.\par 2. LE edema: improved with Lasix 20 mg every other day. Continue compression stockings and leg elevation. F/u LE venous doppler.\par 3. HFpEF: No symptoms. Cont Lasix 20 mg every other day. Check BMP. Instructed to check daily weights. Limit sodium intake.\par 4. HTN:  /70 mmHg today. Cont Amlodipine.\par 5. History of PPM: Details unknown. Will follow with EP.\par \par Plan discussed with the patient and all questions were answered.

## 2021-10-01 NOTE — DISCUSSION/SUMMARY
[Moderate Aortic Stenosis] : moderate aortic stenosis [With Me] : with me [___ Week(s)] : in [unfilled] week(s) [FreeTextEntry1] : 89 yo F with PMH of HTN, PPM, left hip fracture s/p left hemiarthroplasty on 5/14/21 with subsequent dislocation (now wheelchair bound) , chronic HFpEF, severe aortic stenosis s/p BAV 5/14/2021 who presents for follow up.  \par \par 1. Severe AS s/p BAV 5/14/21: SONYA 1 cm2, peak velocity 3.2 m/s, mean gradient 23 mm Hg. Saw [facility] team in Sept 2021- Not a candidate of TAVR at this time ( wheelchair bound). Cont to monitor s/s like CP, SOB, dizziness. She is currently satisfied with her quality of life.\par 2. LE edema: improved with Lasix 20 mg every other day. Continue compression stockings and leg elevation. F/u LE venous doppler.\par 3. HFpEF: No symptoms. Cont Lasix 20 mg every other day. Check BMP. Instructed to check daily weights. Limit sodium intake.\par 4. HTN:  /70 mmHg today. Cont Amlodipine.\par 5. History of PPM: Details unknown. Will follow with EP.

## 2021-10-01 NOTE — PHYSICAL EXAM
[Frail] : frail [Normal] : alert and oriented, normal memory [Murmur] : murmur [de-identified] : CHIQUI Gr3/6 radiating to carotids [de-identified] : SOft S2 [de-identified] : 2+ pitting edema bilateral lower extermities.

## 2021-10-01 NOTE — REASON FOR VISIT
[Symptom and Test Evaluation] : symptom and test evaluation [Structural Heart and Valve Disease] : structural heart and valve disease [Formal Caregiver] : formal caregiver

## 2021-10-01 NOTE — REVIEW OF SYSTEMS
[Lower Ext Edema] : lower extremity edema [Joint Pain] : joint pain [Joint Stiffness] : joint stiffness [Negative] : Genitourinary [Weight Loss (___ Lbs)] : [unfilled] ~Ulb weight loss [Fever] : no fever [Headache] : no headache [Chills] : no chills [Feeling Fatigued] : not feeling fatigued [Eye Pain] : no eye pain [Earache] : no earache [Sore Throat] : no sore throat [SOB] : no shortness of breath [Dyspnea on exertion] : not dyspnea during exertion [Chest Discomfort] : no chest discomfort [Leg Claudication] : no intermittent leg claudication [Palpitations] : no palpitations [Orthopnea] : no orthopnea [PND] : no PND [Syncope] : no syncope [Cough] : no cough [Wheezing] : no wheezing [Dysuria] : no dysuria [Joint Swelling] : no joint swelling [Muscle Cramps] : no muscle cramps [Myalgia] : no myalgia [Itching] : no itching [Dizziness] : no dizziness [Speech Disturbance] : no speech disturbance [Confusion] : no confusion was observed [Easy Bleeding] : no tendency for easy bleeding

## 2021-10-01 NOTE — HISTORY OF PRESENT ILLNESS
[FreeTextEntry1] : \par 89 yo F with PMH of HTN, PPM, left hip fracture s/p left hemiarthroplasty on 5/14/21 with subsequent dislocation (now wheelchair bound) , chronic HFpEF, severe aortic stenosis s/p BAV 5/14/2021 who presents for follow up.  \par \par The patient had a mechanical fall in May 2021 which led to a left femoral hip fracture. While in the hospital, the patient was found to have severe aortic stenosis. The patient had a BAV on 5/14/2021 followed by a left hip hemiarthroplasty on 5/15/2021. She was discharged to SNF. While there, she continued to experience left hip pain and was found to have a dislocation of the prosthesis. Per the family, she was told by the orthopedic surgeon that she would be very high risk for a reoperation to fix the hip. Since then, she remains mostly in a wheelchair and dose not walk as any weight bearing is painful. She was discharged from SNF at the beginning of August and has a HHA 24/7 at home.\par \par On her last visit with [facility] team patient mentioned increased LE edema for which she was prescribed Lasix 20 mg po every other day. She was also instructed to get LE venous doppler - she will get that done today. Since her last visit she has lost weight but unable to quantify as she does not check her weights. \par  \par Currently, the patient reports no chest pain, SOB, STEPHENS, orthopnea, PND, dizziness, syncope and palpitations. She notes significant improvement in her LE edema. She has transient hip pain while transferring to bed. \par \par She is going to see her new PMD in next 2-3 weeks.\par \par

## 2021-10-14 ENCOUNTER — APPOINTMENT (OUTPATIENT)
Dept: INTERNAL MEDICINE | Facility: CLINIC | Age: 86
End: 2021-10-14

## 2021-10-22 ENCOUNTER — APPOINTMENT (OUTPATIENT)
Dept: HEART AND VASCULAR | Facility: CLINIC | Age: 86
End: 2021-10-22

## 2021-10-29 ENCOUNTER — APPOINTMENT (OUTPATIENT)
Dept: HEART AND VASCULAR | Facility: CLINIC | Age: 86
End: 2021-10-29
Payer: MEDICARE

## 2021-10-29 ENCOUNTER — APPOINTMENT (OUTPATIENT)
Dept: HEART AND VASCULAR | Facility: CLINIC | Age: 86
End: 2021-10-29

## 2021-10-29 VITALS
TEMPERATURE: 96.4 F | HEART RATE: 70 BPM | DIASTOLIC BLOOD PRESSURE: 60 MMHG | SYSTOLIC BLOOD PRESSURE: 133 MMHG | BODY MASS INDEX: 20.16 KG/M2 | HEIGHT: 59 IN | WEIGHT: 100 LBS

## 2021-10-29 PROCEDURE — 99214 OFFICE O/P EST MOD 30 MIN: CPT

## 2021-10-29 PROCEDURE — 93280 PM DEVICE PROGR EVAL DUAL: CPT

## 2021-10-29 NOTE — DISCUSSION/SUMMARY
[Moderate Aortic Stenosis] : moderate aortic stenosis [With Me] : with me [___ Month(s)] : in [unfilled] month(s)

## 2021-10-29 NOTE — PROCEDURE
[No] : not [NSR] : normal sinus rhythm [Pacemaker] : pacemaker [DDD] : DDD [Voltage: ___ volts] : Voltage was [unfilled] volts [Normal] : The battery status is normal. [Threshold Testing Performed] : Threshold testing was performed [Lead Imp:  ___ohms] : lead impedance was [unfilled] ohms [Sensing Amplitude ___mv] : sensing amplitude was [unfilled] mv [___V @] : [unfilled] V [___ ms] : [unfilled] ms [de-identified] : Medtronic [de-identified] : [employer]visa  MRI [de-identified] : [id number] [de-identified] : 31 Oct 88595 [de-identified] : 60 -120 [de-identified] : 6.5 years

## 2021-10-29 NOTE — REVIEW OF SYSTEMS
[Lower Ext Edema] : lower extremity edema [Joint Pain] : joint pain [Joint Stiffness] : joint stiffness [Negative] : Psychiatric [Fever] : no fever [Headache] : no headache [Chills] : no chills [Feeling Fatigued] : not feeling fatigued [Weight Loss (___ Lbs)] : no recent weight loss [Eye Pain] : no eye pain [Earache] : no earache [Sore Throat] : no sore throat [SOB] : no shortness of breath [Dyspnea on exertion] : not dyspnea during exertion [Chest Discomfort] : no chest discomfort [Leg Claudication] : no intermittent leg claudication [Palpitations] : no palpitations [Orthopnea] : no orthopnea [PND] : no PND [Syncope] : no syncope [Cough] : no cough [Wheezing] : no wheezing [Dysuria] : no dysuria [Joint Swelling] : no joint swelling [Muscle Cramps] : no muscle cramps [Myalgia] : no myalgia [Itching] : no itching [Dizziness] : no dizziness [Speech Disturbance] : no speech disturbance [Confusion] : no confusion was observed [Easy Bleeding] : no tendency for easy bleeding

## 2021-10-29 NOTE — REVIEW OF SYSTEMS
[Weight Loss (___ Lbs)] : [unfilled] ~Ulb weight loss [Lower Ext Edema] : lower extremity edema [Joint Pain] : joint pain [Joint Stiffness] : joint stiffness [Negative] : Psychiatric [Fever] : no fever [Headache] : no headache [Chills] : no chills [Feeling Fatigued] : not feeling fatigued [Eye Pain] : no eye pain [Earache] : no earache [Sore Throat] : no sore throat [SOB] : no shortness of breath [Dyspnea on exertion] : not dyspnea during exertion [Chest Discomfort] : no chest discomfort [Leg Claudication] : no intermittent leg claudication [Palpitations] : no palpitations [Orthopnea] : no orthopnea [PND] : no PND [Syncope] : no syncope [Cough] : no cough [Wheezing] : no wheezing [Dysuria] : no dysuria [Joint Swelling] : no joint swelling [Muscle Cramps] : no muscle cramps [Myalgia] : no myalgia [Itching] : no itching [Dizziness] : no dizziness [Speech Disturbance] : no speech disturbance [Confusion] : no confusion was observed [Easy Bleeding] : no tendency for easy bleeding

## 2021-10-29 NOTE — PHYSICAL EXAM
[Frail] : frail [Murmur] : murmur [Normal] : alert and oriented, normal memory [Left Infraclavicular] : left infraclavicular area [Clean] : clean [Dry] : dry [Well-Healed] : well-healed [de-identified] : CHIQUI Gr3/6 radiating to carotids [de-identified] : SOft S2 [de-identified] : 2+ pitting edema bilateral lower extermities.

## 2021-10-29 NOTE — HISTORY OF PRESENT ILLNESS
[FreeTextEntry1] : \par 89 yo F with PMH of HTN, PPM, left hip fracture s/p left hemiarthroplasty on 5/14/21 with subsequent dislocation (now wheelchair bound) , chronic HFpEF, severe aortic stenosis s/p BAV 5/14/2021 who presents for follow up.  \par \par The patient had a mechanical fall in May 2021 which led to a left femoral hip fracture. While in the hospital, the patient was found to have severe aortic stenosis. The patient had a BAV on 5/14/2021 followed by a left hip hemiarthroplasty on 5/15/2021. She was discharged to SNF. While there, she continued to experience left hip pain and was found to have a dislocation of the prosthesis. Per the family, she was told by the orthopedic surgeon that she would be very high risk for a reoperation to fix the hip. Since then, she remains mostly in a wheelchair and dose not walk as any weight bearing is painful. She was discharged from SNF at the beginning of August and has a HHA 24/7 at home.\par \par On her visit with [facility] team (Sept 2021) patient mentioned increased LE edema for which she was prescribed Lasix 20 mg po every other day. She was also instructed to get LE venous doppler. SHe got that done on Oct 1,21.\par  \par Currently, the patient reports no chest pain, SOB, STEPHENS, orthopnea, PND, dizziness, syncope and palpitations. She notes improvement in her LE edema since she started taking Lasix however notes frequent urination the day she takes it. She has transient hip pain while transferring to bed. \par \par She is going to see her new PMD in 2-3 days.\par \par

## 2021-10-29 NOTE — CARDIOLOGY SUMMARY
[de-identified] : 10/1/21 : nsr, first degree AV block, poor R wave progression [de-identified] : 5/14/21: Severe AS, PV 4m/s, mean: 36 mmHg, SONYA ).67 cm2, DVI: 0.26, Gr 1 diastolic dysfunction, Normal Biv fxn., mild MSD, 3 mm\par 3 mm Hg at 64 bpm, PASP 39 mmHg, no pericardial effusion.\par 9/13/21: Moderate AS, PV 3.2 m/s, mean 23 mmHg, SONYA 1 cm2, Gr 1 diastolic dysfunction, Normal Biv Fxn, PASP 37 mmHg.\par

## 2021-10-29 NOTE — DISCUSSION/SUMMARY
[FreeTextEntry1] : 89 yo F with PMH of HTN, PPM, left hip fracture s/p left hemiarthroplasty on 5/14/21 with subsequent dislocation (now wheelchair bound) , chronic HFpEF, severe aortic stenosis s/p BAV 5/14/2021 who presents for follow up.    1. Severe AS s/p BAV 5/14/21:  Echo 13 Sept,21; SONYA 1 cm2, peak velocity 3.2 m/s, mean gradient 23 mm Hg. Saw [facility] team in Sept 2021- Not a candidate of TAVR at this time ( wheelchair bound). Cont to monitor s/s like CP, SOB, dizziness. She is currently satisfied with her quality of life. Repeat echo in April 2022 or sooner if she develops any symptoms. 2. LE edema: improved with Lasix 20 mg every other day. Continue compression stockings and leg elevation.  LE venous doppler: limited assessment of the LLE, no DVT in visualized veins. Cont Lasix.  3. HFpEF: No symptoms (wheelchair bound). Cont Lasix 20 mg every other day. Complains of frequent urination with current dose - Check BMP. Instructed to check daily weights. Limit sodium intake. 4. HTN:  /60 mmHg today. Cont Amlodipine. 5. History of PPM: Details unknown. Will get pacemaker check today.  Plan discussed with the patient and all questions were answered. F/u in April 2022 with echo.

## 2021-10-29 NOTE — ASSESSMENT
[FreeTextEntry1] : 89 yo F with PMH of HTN, PPM, left hip fracture s/p left hemiarthroplasty on 5/14/21 with subsequent dislocation (now wheelchair bound) , chronic HFpEF, severe aortic stenosis s/p BAV 5/14/2021 who presents for follow up.  \par \par 1. Severe AS s/p BAV 5/14/21:  Echo 13 Sept,21; SONYA 1 cm2, peak velocity 3.2 m/s, mean gradient 23 mm Hg. Saw [facility] team in Sept 2021- Not a candidate of TAVR at this time ( wheelchair bound). Cont to monitor s/s like CP, SOB, dizziness. She is currently satisfied with her quality of life. Repeat echo in April 2022 or sooner if she develops any symptoms.\par 2. LE edema: improved with Lasix 20 mg every other day. Continue compression stockings and leg elevation.  LE venous doppler: limited assessment of the LLE, no DVT in visualized veins. Cont Lasix. \par 3. HFpEF: No symptoms (wheelchair bound). Cont Lasix 20 mg every other day. Complains of frequent urination with current dose - Check BMP. Instructed to check daily weights. Limit sodium intake.\par 4. HTN:  /60 mmHg today. Cont Amlodipine.\par 5. History of PPM: Details unknown. Will get pacemaker check today.\par \par Plan discussed with the patient and all questions were answered.\par F/u in April 2022 with echo.

## 2021-10-29 NOTE — PHYSICAL EXAM
[Frail] : frail [Murmur] : murmur [Normal] : soft, non-tender, no masses/organomegaly, normal bowel sounds [Normal Speech] : normal speech [Alert and Oriented] : alert and oriented [de-identified] : CHIQUI Gr3/6 radiating to carotids [de-identified] : SOft S2 [de-identified] : 2+ pitting edema bilateral lower extermities. pitting edema dorsum of b/l feet.

## 2021-10-29 NOTE — HISTORY OF PRESENT ILLNESS
[FreeTextEntry1] : 89 yo F with PMH of HTN, PPM, left hip fracture s/p left hemiarthroplasty on 5/14/21 with subsequent dislocation (now wheelchair bound) , chronic HFpEF, severe aortic stenosis s/p BAV 5/14/2021 who presens for pacemaker evaluation.  She was last checked about one year ago.  The device was implanted at [facility] in [address].  \par \par She has no pacemaker related complaints.\par \par The patient denies chest pain, SOB, STEPHENS, palpitation, light headedness, syncope or change in exertional capacity - she is wheelchair bound.\par \par \par

## 2021-10-29 NOTE — CARDIOLOGY SUMMARY
[de-identified] : 10/1/21 : nsr, first degree AV block, poor R wave progression [de-identified] : 5/14/21: Severe AS, PV 4m/s, mean: 36 mmHg, SONYA ).67 cm2, DVI: 0.26, Gr 1 diastolic dysfunction, Normal Biv fxn., mild MSD, 3 mm\par 3 mm Hg at 64 bpm, PASP 39 mmHg, no pericardial effusion.\par 9/13/21: Moderate AS, PV 3.2 m/s, mean 23 mmHg, SONYA 1 cm2, Gr 1 diastolic dysfunction, Normal Biv Fxn, PASP 37 mmHg.\par  [de-identified] : WILLIAM Venous duplex Oct 1,21: 1. Significantly limited assessment of the left lower extremity ( difficulty assessing left thigh and calf).2. No deep vein thrombosis in the visualized veins.

## 2021-10-29 NOTE — REASON FOR VISIT
[New Patient Device Check] : is here today for a new patient device check visit for [Formal Caregiver] : formal caregiver

## 2021-11-01 ENCOUNTER — APPOINTMENT (OUTPATIENT)
Dept: INTERNAL MEDICINE | Facility: CLINIC | Age: 86
End: 2021-11-01

## 2021-11-01 LAB
ANION GAP SERPL CALC-SCNC: 11 MMOL/L
BUN SERPL-MCNC: 10 MG/DL
CALCIUM SERPL-MCNC: 9.1 MG/DL
CHLORIDE SERPL-SCNC: 105 MMOL/L
CO2 SERPL-SCNC: 26 MMOL/L
CREAT SERPL-MCNC: 0.56 MG/DL
GLUCOSE SERPL-MCNC: 91 MG/DL
POTASSIUM SERPL-SCNC: 4 MMOL/L
SODIUM SERPL-SCNC: 142 MMOL/L

## 2022-04-29 ENCOUNTER — APPOINTMENT (OUTPATIENT)
Dept: HEART AND VASCULAR | Facility: CLINIC | Age: 87
End: 2022-04-29
Payer: MEDICARE

## 2022-04-29 ENCOUNTER — OUTPATIENT (OUTPATIENT)
Dept: OUTPATIENT SERVICES | Facility: HOSPITAL | Age: 87
LOS: 1 days | End: 2022-04-29
Payer: MEDICARE

## 2022-04-29 VITALS
OXYGEN SATURATION: 97 % | TEMPERATURE: 95.4 F | DIASTOLIC BLOOD PRESSURE: 73 MMHG | SYSTOLIC BLOOD PRESSURE: 135 MMHG | HEART RATE: 77 BPM

## 2022-04-29 DIAGNOSIS — I35.0 NONRHEUMATIC AORTIC (VALVE) STENOSIS: ICD-10-CM

## 2022-04-29 PROCEDURE — 93306 TTE W/DOPPLER COMPLETE: CPT

## 2022-04-29 PROCEDURE — 99214 OFFICE O/P EST MOD 30 MIN: CPT

## 2022-04-29 PROCEDURE — 93280 PM DEVICE PROGR EVAL DUAL: CPT

## 2022-04-29 PROCEDURE — 93306 TTE W/DOPPLER COMPLETE: CPT | Mod: 26

## 2022-04-29 NOTE — CARDIOLOGY SUMMARY
[de-identified] : 10/1/21 : nsr, first degree AV block, poor R wave progression [de-identified] : 5/14/21: Severe AS, PV 4m/s, mean: 36 mmHg, SONYA ).67 cm2, DVI: 0.26, Gr 1 diastolic dysfunction, Normal Biv fxn., mild MSD, 3 mm\par 3 mm Hg at 64 bpm, PASP 39 mmHg, no pericardial effusion.\par 9/13/21: Moderate AS, PV 3.2 m/s, mean 23 mmHg, SONYA 1 cm2, Gr 1 diastolic dysfunction, Normal Biv Fxn, PASP 37 mmHg.\par

## 2022-04-29 NOTE — REVIEW OF SYSTEMS
[Fever] : no fever [Headache] : no headache [Chills] : no chills [Feeling Fatigued] : not feeling fatigued [Weight Loss (___ Lbs)] : [unfilled] ~Ulb weight loss [Eye Pain] : no eye pain [Earache] : no earache [Sore Throat] : no sore throat [SOB] : no shortness of breath [Dyspnea on exertion] : not dyspnea during exertion [Chest Discomfort] : no chest discomfort [Lower Ext Edema] : lower extremity edema [Leg Claudication] : no intermittent leg claudication [Palpitations] : no palpitations [Orthopnea] : no orthopnea [PND] : no PND [Syncope] : no syncope [Cough] : no cough [Wheezing] : no wheezing [Dysuria] : no dysuria [Joint Pain] : joint pain [Joint Swelling] : no joint swelling [Muscle Cramps] : no muscle cramps [Joint Stiffness] : joint stiffness [Myalgia] : no myalgia [Itching] : no itching [Dizziness] : no dizziness [Speech Disturbance] : no speech disturbance [Confusion] : no confusion was observed [Easy Bleeding] : no tendency for easy bleeding [Negative] : Psychiatric

## 2022-04-29 NOTE — HISTORY OF PRESENT ILLNESS
[FreeTextEntry1] : 89 yo F with PMH of HTN, PPM, left hip fracture s/p left hemiarthroplasty on 5/14/21 with subsequent dislocation (now wheelchair bound) , chronic HFpEF, severe aortic stenosis s/p BAV 5/14/2021 who presents for pacemaker evaluation. The device was implanted at [facility] in 10/2016.  \par \par She has no pacemaker related complaints.\par \par The patient denies chest pain, SOB, STEPHENS, palpitation, light headedness, syncope or change in exertional capacity - she is wheelchair bound.\par \par \par

## 2022-04-29 NOTE — PHYSICAL EXAM
[Left Infraclavicular] : left infraclavicular area [Clean] : clean [Dry] : dry [Well-Healed] : well-healed [Frail] : frail [Murmur] : murmur [Normal] : alert and oriented, normal memory [de-identified] : CHIQUI Gr3/6 radiating to carotids [de-identified] : SOft S2 [de-identified] : 2+ pitting edema bilateral lower extermities.

## 2022-04-29 NOTE — DISCUSSION/SUMMARY
[Pacemaker Function Normal] : normal pacemaker function [de-identified] : RTO in 6 months. [Moderate Aortic Stenosis] : moderate aortic stenosis [With Me] : with me [___ Month(s)] : in [unfilled] month(s)

## 2022-04-29 NOTE — PROCEDURE
[No] : not [NSR] : normal sinus rhythm [Pacemaker] : pacemaker [DDD] : DDD [Voltage: ___ volts] : Voltage was [unfilled] volts [Normal] : The battery status is normal. [Threshold Testing Performed] : Threshold testing was performed [Lead Imp:  ___ohms] : lead impedance was [unfilled] ohms [Sensing Amplitude ___mv] : sensing amplitude was [unfilled] mv [___V @] : [unfilled] V [___ ms] : [unfilled] ms [de-identified] : Medtronic [de-identified] : [employer]visa  MRI [de-identified] : [id number] [de-identified] : 31 Oct 24159 [de-identified] : 60 -120 [de-identified] : 5.5 years

## 2022-05-06 NOTE — HISTORY OF PRESENT ILLNESS
[FreeTextEntry1] : \par 89 yo F with PMH of HTN, PPM, left hip fracture s/p left hemiarthroplasty on 5/14/21 with subsequent dislocation (now wheelchair bound) , chronic HFpEF, severe aortic stenosis s/p BAV 5/14/2021 who presents for follow up.  \par \par The patient had a mechanical fall in May 2021 which led to a left femoral hip fracture. While in the hospital, the patient was found to have severe aortic stenosis. The patient had a BAV on 5/14/2021 followed by a left hip hemiarthroplasty on 5/15/2021. She was discharged to SNF. While there, she continued to experience left hip pain and was found to have a dislocation of the prosthesis. Per the family, she was told by the orthopedic surgeon that she would be very high risk for a reoperation to fix the hip. Since then, she remains mostly in a wheelchair and dose not walk as any weight bearing is painful. She has a HHA 24/7 at home.\par  \par She denies any chest pain, SOB, STEPHENS, orthopnea, PND, dizziness, near syncope, syncope and palpitations. She mentions no significant change in her LE edema and would like to cut her lasix dose as it interferes with her social life. She notes improvement in her swelling with leg elevation. \par \par \par \par

## 2022-05-06 NOTE — PHYSICAL EXAM
[Frail] : frail [Murmur] : murmur [Normal] : soft, non-tender, no masses/organomegaly, normal bowel sounds [Normal Speech] : normal speech [Alert and Oriented] : alert and oriented [de-identified] : CHIQUI Gr3/6 radiating to carotids [de-identified] : SOft S2 [de-identified] : 2+ pitting edema bilateral lower extremities. pitting edema dorsum of b/l feet.

## 2022-05-06 NOTE — CARDIOLOGY SUMMARY
[de-identified] : 10/1/21 : nsr, first degree AV block, poor R wave progression [de-identified] : 5/14/21: Severe AS, PV 4m/s, mean: 36 mmHg, SONYA ).67 cm2, DVI: 0.26, Gr 1 diastolic dysfunction, Normal Biv fxn., mild MSD, 3 mm\par 3 mm Hg at 64 bpm, PASP 39 mmHg, no pericardial effusion.\par 9/13/21: Moderate AS, PV 3.2 m/s, mean 23 mmHg, SONYA 1 cm2, Gr 1 diastolic dysfunction, Normal Biv Fxn, PASP 37 mmHg.\par 4/29/22: Mod to severe AS, peak V: 3.1, mean gradient 21, Normal Biv fxn, mod LVH,PASP 42 mmHg, no effusion.\par  [de-identified] : WILLIAM Venous duplex Oct 1,21: 1. Significantly limited assessment of the left lower extremity ( difficulty assessing left thigh and calf).2. No deep vein thrombosis in the visualized veins.

## 2022-05-06 NOTE — ASSESSMENT
[FreeTextEntry1] : 89 yo F with PMH of HTN, PPM, left hip fracture s/p left hemiarthroplasty on 5/14/21 with subsequent dislocation (now wheelchair bound) , chronic HFpEF, severe aortic stenosis s/p BAV 5/14/2021 who presents for follow up. Refused to get EKG done today. \par \par 1. Severe AS s/p BAV 5/14/21:  Echo 4/29/22 reviewed - no significant change in transaortic gradients, mild AI.  Saw [facility] team in Sept 2021- Not a candidate of TAVR ( wheelchair bound). Cont to monitor s/s like CP, SOB, dizziness. She is currently satisfied with her quality of life. Repeat echo if she develops any symptoms.\par 2. LE edema: On Lasix 20 mg every other day. She continues to have 2+pitting edema but is hesitant to try increased Lasix dosing. Continue compression stockings and leg elevation. Advised to take lasix everyday for atleast 3 days - will re discuss with her. Check BMP if she stays on daily lasix.\par 3. HFpEF: No symptoms (wheelchair bound). On Lasix 20 mg every other day. Will increase lasix 20 mg to daily if she tolerates. Check BMP in one week. Limit sodium and fluid intake. Continue to monitor daily weights.\par 4. HTN:  /73 mmHg today. Cont Amlodipine.\par 5. PPM: Follows Dr. Villatoro. She will get her device checked today.\par \par Plan discussed with the patient and all questions were answered.\par F/u with Dr. Colón in 3 months.

## 2022-05-13 LAB
ANION GAP SERPL CALC-SCNC: 14 MMOL/L
BUN SERPL-MCNC: 11 MG/DL
CALCIUM SERPL-MCNC: 9.1 MG/DL
CHLORIDE SERPL-SCNC: 101 MMOL/L
CO2 SERPL-SCNC: 28 MMOL/L
CREAT SERPL-MCNC: 0.67 MG/DL
EGFR: 79 ML/MIN/1.73M2
GLUCOSE SERPL-MCNC: 153 MG/DL
POTASSIUM SERPL-SCNC: 3.7 MMOL/L
SODIUM SERPL-SCNC: 143 MMOL/L

## 2022-07-19 ENCOUNTER — APPOINTMENT (OUTPATIENT)
Dept: HEART AND VASCULAR | Facility: CLINIC | Age: 87
End: 2022-07-19

## 2022-07-19 ENCOUNTER — NON-APPOINTMENT (OUTPATIENT)
Age: 87
End: 2022-07-19

## 2022-07-19 VITALS
DIASTOLIC BLOOD PRESSURE: 71 MMHG | TEMPERATURE: 97.5 F | SYSTOLIC BLOOD PRESSURE: 137 MMHG | HEART RATE: 78 BPM | WEIGHT: 130 LBS | OXYGEN SATURATION: 97 % | BODY MASS INDEX: 26.21 KG/M2 | HEIGHT: 59 IN

## 2022-07-19 PROCEDURE — 99214 OFFICE O/P EST MOD 30 MIN: CPT

## 2022-07-19 PROCEDURE — 93000 ELECTROCARDIOGRAM COMPLETE: CPT

## 2022-07-19 RX ORDER — ASPIRIN 81 MG
81 TABLET, DELAYED RELEASE (ENTERIC COATED) ORAL DAILY
Refills: 0 | Status: DISCONTINUED | COMMUNITY
End: 2022-07-19

## 2022-07-19 NOTE — CARDIOLOGY SUMMARY
[de-identified] : 7/19/22: NSR, LVH, poor R wave progression\par 10/1/21 : nsr, first degree AV block, poor R wave progression [de-identified] : 5/14/21: Severe AS, PV 4m/s, mean: 36 mmHg, SONYA ).67 cm2, DVI: 0.26, Gr 1 diastolic dysfunction, Normal Biv fxn., mild MSD, 3 mm\par 3 mm Hg at 64 bpm, PASP 39 mmHg, no pericardial effusion.\par 9/13/21: Moderate AS, PV 3.2 m/s, mean 23 mmHg, SONYA 1 cm2, Gr 1 diastolic dysfunction, Normal Biv Fxn, PASP 37 mmHg.\par 4/29/22: Mod to severe AS, peak V: 3.1, mean gradient 21, Normal Biv fxn, mod LVH,PASP 42 mmHg, no effusion.\par  [de-identified] : WILLIAM Venous duplex Oct 1,21: 1. Significantly limited assessment of the left lower extremity ( difficulty assessing left thigh and calf).2. No deep vein thrombosis in the visualized veins.

## 2022-07-19 NOTE — DISCUSSION/SUMMARY
[FreeTextEntry1] : Very pleasant 89 yo F with PMH of HTN, PPM, left hip fracture s/p left hemiarthroplasty on 5/14/21 with subsequent dislocation (now wheelchair bound) , chronic HFpEF, severe aortic stenosis s/p BAV 5/14/2021 who presents for follow up.\par \par 1. Severe AS s/p BAV 5/14/21: I reviewed the echo from 4/29/22 - no significant change in transaortic gradients, mild AI. Saw [facility] team in Sept 2021- Not a candidate of TAVR (wheelchair bound). Cont to monitor symptoms (CP, SOB, dizziness). She is currently satisfied with her quality of life. Repeat echo in 10/2022\par 2. LE edema: She is taking lasix 20 mg qd. Can take Lasix 20 mg every other day since the leg edema does not bother her. Last BMP was wnl. Will re-check next visit. \par 3. HFpEF: No symptoms (wheelchair bound). On Lasix 20 mg every other day.Limit sodium and fluid intake. Continue to monitor daily weights.\par 4. HTN:  /71 mmHg today. Cont Amlodipine 5 mg qd. .\par 5. Hyperlipidemia - cont Lipitor 10 mg qhs. Check lipid panel next visit and LFTs\par 6. s/p PPM: Follows Dr. Villatoro. \par \par \par f/u in 3 mos\par Plan discussed with the patient and all questions were answered.\par F/u with Dr. Colón in 3 months. [EKG obtained to assist in diagnosis and management of assessed problem(s)] : EKG obtained to assist in diagnosis and management of assessed problem(s)

## 2022-07-19 NOTE — PHYSICAL EXAM
[Frail] : frail [Murmur] : murmur [Normal] : soft, non-tender, no masses/organomegaly, normal bowel sounds [Normal Speech] : normal speech [Alert and Oriented] : alert and oriented [Normal S1, S2] : normal S1, S2 [de-identified] : CHIQUI Gr3/6 radiating to carotids [de-identified] : 4/6 systolic murmur at RUSB [de-identified] : 2+ pitting edema bilateral lower extremities (L>R). pitting edema dorsum of b/l feet.

## 2022-10-17 ENCOUNTER — FORM ENCOUNTER (OUTPATIENT)
Age: 87
End: 2022-10-17

## 2022-10-18 ENCOUNTER — OUTPATIENT (OUTPATIENT)
Dept: OUTPATIENT SERVICES | Facility: HOSPITAL | Age: 87
LOS: 1 days | End: 2022-10-18
Payer: MEDICARE

## 2022-10-18 ENCOUNTER — APPOINTMENT (OUTPATIENT)
Dept: HEART AND VASCULAR | Facility: CLINIC | Age: 87
End: 2022-10-18

## 2022-10-18 VITALS — HEART RATE: 67 BPM | DIASTOLIC BLOOD PRESSURE: 78 MMHG | SYSTOLIC BLOOD PRESSURE: 210 MMHG

## 2022-10-18 VITALS
HEIGHT: 59 IN | OXYGEN SATURATION: 96 % | TEMPERATURE: 95 F | HEART RATE: 67 BPM | DIASTOLIC BLOOD PRESSURE: 72 MMHG | SYSTOLIC BLOOD PRESSURE: 209 MMHG

## 2022-10-18 VITALS — DIASTOLIC BLOOD PRESSURE: 81 MMHG | SYSTOLIC BLOOD PRESSURE: 186 MMHG

## 2022-10-18 DIAGNOSIS — Z95.0 PRESENCE OF CARDIAC PACEMAKER: ICD-10-CM

## 2022-10-18 DIAGNOSIS — R00.1 BRADYCARDIA, UNSPECIFIED: ICD-10-CM

## 2022-10-18 DIAGNOSIS — I35.0 NONRHEUMATIC AORTIC (VALVE) STENOSIS: ICD-10-CM

## 2022-10-18 DIAGNOSIS — I50.32 CHRONIC DIASTOLIC (CONGESTIVE) HEART FAILURE: ICD-10-CM

## 2022-10-18 DIAGNOSIS — R60.0 LOCALIZED EDEMA: ICD-10-CM

## 2022-10-18 PROCEDURE — 99214 OFFICE O/P EST MOD 30 MIN: CPT

## 2022-10-18 PROCEDURE — 93306 TTE W/DOPPLER COMPLETE: CPT

## 2022-10-18 PROCEDURE — 93306 TTE W/DOPPLER COMPLETE: CPT | Mod: 26

## 2022-10-18 RX ORDER — ATORVASTATIN CALCIUM 10 MG/1
10 TABLET, FILM COATED ORAL
Qty: 30 | Refills: 1 | Status: DISCONTINUED | COMMUNITY
Start: 1900-01-01 | End: 2022-10-18

## 2022-10-18 RX ORDER — FUROSEMIDE 20 MG/1
20 TABLET ORAL
Qty: 45 | Refills: 3 | Status: DISCONTINUED | COMMUNITY
Start: 2021-09-15 | End: 2022-10-18

## 2022-10-18 RX ORDER — AMLODIPINE BESYLATE 5 MG/1
5 TABLET ORAL DAILY
Qty: 90 | Refills: 3 | Status: DISCONTINUED | COMMUNITY
Start: 1900-01-01 | End: 2022-10-18

## 2022-10-18 NOTE — CARDIOLOGY SUMMARY
[de-identified] : 7/19/22: NSR, LVH, poor R wave progression\par 10/1/21 : nsr, first degree AV block, poor R wave progression [de-identified] : 10/18/22: ECHO - normal biventricular systolic function, mild DD, no effusion. AS gradients stable - moderate to severe.\par 5/14/21: Severe AS, PV 4m/s, mean: 36 mmHg, SONYA ).67 cm2, DVI: 0.26, Gr 1 diastolic dysfunction, Normal Biv fxn., mild MSD, 3 mm\par 3 mm Hg at 64 bpm, PASP 39 mmHg, no pericardial effusion.\par 9/13/21: Moderate AS, PV 3.2 m/s, mean 23 mmHg, SONYA 1 cm2, Gr 1 diastolic dysfunction, Normal Biv Fxn, PASP 37 mmHg.\par 4/29/22: Mod to severe AS, peak V: 3.1, mean gradient 21, Normal Biv fxn, mod LVH,PASP 42 mmHg, no effusion.\par  [de-identified] : WILLIAM Venous duplex Oct 1,21: 1. Significantly limited assessment of the left lower extremity ( difficulty assessing left thigh and calf).2. No deep vein thrombosis in the visualized veins.

## 2022-10-18 NOTE — PHYSICAL EXAM
[Frail] : frail [Normal S1, S2] : normal S1, S2 [Murmur] : murmur [Normal] : soft, non-tender, no masses/organomegaly, normal bowel sounds [Normal Speech] : normal speech [Alert and Oriented] : alert and oriented [de-identified] : CHIQUI Gr3/6 radiating to carotids [de-identified] : 4/6 systolic murmur at RUSB [de-identified] : trace edema b/l

## 2022-10-18 NOTE — DISCUSSION/SUMMARY
[FreeTextEntry1] : Very pleasant 89 yo F with PMH of HTN, PPM, left hip fracture s/p left hemiarthroplasty on 5/14/21 with subsequent dislocation (now wheelchair bound) , chronic HFpEF, severe aortic stenosis s/p BAV 5/14/2021 who presents for follow up.\par \par 1. Severe AS s/p BAV 5/14/21: I reviewed the echo from 4/29/22 - no significant change in transaortic gradients, mild AI. Saw [facility] team in Sept 2021- Not a candidate of TAVR (wheelchair bound). Cont to monitor symptoms (CP, SOB, dizziness). She is currently satisfied with her quality of life. Repeat echo today on 10/18/22 shows stable transaortic gradients in the moderate to severe range. \par 2. LE edema: She is off all meds. Will give Rx for Lasix 20 mg qd prn, if needed. \par 3. HFpEF: No symptoms (wheelchair bound). Lasix 20 mg qd prn as above. \par 4. HTN: Hypertensive today in setting of being stressed getting here.  /60-70 mmHg at home. Cont to monitor. \par 5. Hyperlipidemia - Used to be on Lipitor 10 mg qhs. Now off, which is probably OK given advanced age.  \par 6. s/p PPM: Follows Dr. Villatoro. \par 7. Will check all labs next visit. Pt is tired today already from the visit. She will also get a PMD near her house. \par \par

## 2022-10-18 NOTE — HISTORY OF PRESENT ILLNESS
[FreeTextEntry1] : Very pleasant 89 yo F with PMH of HTN, PPM, left hip fracture s/p left hemiarthroplasty on 5/14/21 with subsequent dislocation (now wheelchair bound), chronic HFpEF, severe aortic stenosis s/p BAV 5/14/2021 who presents for follow up.  \par \par The patient had a mechanical fall in May 2021 which led to a left femoral hip fracture. While in the hospital, the patient was found to have severe aortic stenosis. The patient had a BAV on 5/14/2021 followed by a left hip hemiarthroplasty on 5/15/2021. She was discharged to SNF. While there, she continued to experience left hip pain and was found to have a dislocation of the prosthesis. Per the family, she was told by the orthopedic surgeon that she would be very high risk for a reoperation to fix the hip. Since then, she remains mostly in a wheelchair and dose not walk as any weight bearing is painful. She lives at home and has a HHA 24/7\par  \par 10/18/22: She denies any chest pain, SOB, STEPHENS, orthopnea, PND, dizziness, near syncope, syncope and palpitations.Leg edema has improved significantly and stopped taking her lasix completely. She is off all meds. Her BP is elevated today but she says it is because she was stressed getting here. Her BP usually is 130/60-70 mmHg at home. \par \par She used to work as RN. She is well read and reads daily. \par \par \par

## 2022-10-18 NOTE — REVIEW OF SYSTEMS
[Joint Pain] : joint pain [Joint Stiffness] : joint stiffness [Negative] : Psychiatric [Fever] : no fever [Headache] : no headache [Chills] : no chills [Feeling Fatigued] : not feeling fatigued [Weight Loss (___ Lbs)] : no recent weight loss [Eye Pain] : no eye pain [Earache] : no earache [Sore Throat] : no sore throat [SOB] : no shortness of breath [Dyspnea on exertion] : not dyspnea during exertion [Chest Discomfort] : no chest discomfort [Lower Ext Edema] : no extremity edema [Leg Claudication] : no intermittent leg claudication [Palpitations] : no palpitations [Orthopnea] : no orthopnea [PND] : no PND [Syncope] : no syncope [Cough] : no cough [Wheezing] : no wheezing [Dysuria] : no dysuria [Joint Swelling] : no joint swelling [Muscle Cramps] : no muscle cramps [Myalgia] : no myalgia [Itching] : no itching [Dizziness] : no dizziness [Speech Disturbance] : no speech disturbance [Confusion] : no confusion was observed [Easy Bleeding] : no tendency for easy bleeding

## 2022-11-29 NOTE — DIETITIAN INITIAL EVALUATION ADULT. - CONTINUE CURRENT NUTRITION CARE PLAN
Operative Note    Patient: Breana Cifuentes  : 1969  MRN: [id number]    Date of Service: 2022    Preoperative diagnosis:   - Postmenopausal bleeding    Postoperative diagnosis:   - same as above    Procedure: Laparoscopic assisted vaginal hysterectomy, bilateral salpingectomy, cystoscopy    Surgeon: Cici Kam MD  Assistants: Lisa Silverio    Anesthesia: GETA  Complications:  none  EBL: 10 cc  Urine: 50 cc of clear urine    Findings: Normal sized uterus in mid position on EUA, no intraabdominal pathology noted on scan of abdomen. Normal uterus, fallopian tubes, and small ovaries. What appeared to be peritoneal cysts were free floating in the posterior cul du sac.     Indications: Breana Cifuentes is a 53 year old female who presented with postmenopausal bleeding and had normal hysteroscopy, D&C except for a benign polyp. She continued having bleeding and has elected to proceed with hysterectomy.  The risks, benefits and alternatives of surgery were discussed in detail with the patient and she agreed to proceed.  Informed consent was signed prior to the procedure.      Procedure:  The patient was taken to the operating room where she was prepped and draped in the usual sterile fashion. GETA was induced and found to be adequate. She was positioned to the dorsal lithotomy position with yellow-fin stirrups. Patient safety time out was performed. A sterile open sided speculum was placed in the patient's vagina and the cervix visualized.  A single toothed tenaculum was placed on the anterior lip of the cervix and used for traction.  The uterus was gently sounded and the [place] uterine manipulator was placed.    A 5 mm umbilical skin incision was made. The Veress needle was placed without difficulty with intraabdominal placement suggested with water drop test and an opening pressure was noted to be 2 mm of mercury. The abdomen was filled to a pressure of 15 mm Hg.  The Veress needle was removed and a 5 mm  Visiport was placed. No intraabdominal injury was noted.  A 5 mm skin incision was then made in the RLQ. Through this a second trocar sleeve was placed into the abdominal cavity using direct observation with the laparoscope.  A 5 mm incision was then made in the LLQ and a trocar was placed under direct visualization.  A survey of the abdomen and pelvis was completed with the above noted findings.  The manipulator was used to elevate the uterus and bilateral ureters were noted to be vermiculating. The right IP was elevated, coagulated and transected with the LigaSure.  The right round ligament was coagulated and transected with the LigaSure. The vesicouterine peritoneum divided sharply and bluntly to create a bladder flap which was extended across the anterior surface of the uterus at vesicouterine junction. The right uterine artery was then completely isolated and coagulated using the LigaSure. It was then divided and initial blanching of the uterus was noted. Attention was then turned to the left IP which was similarly elevated, coagulated, and transected followed by division of the round ligament. The broad ligament was then divided and the bladder flap completed. The left uterine artery was then cauterized in three locations and transected in the middle with further blanching of the uterus.  After further dissection of the bladder from the cervix the colpotomy was performed with monopolar scissors and the uterus was completely .  The specimen was extracted vaginally with the cystic structure. The vaginal cuff was closed laparoscopically with V Loc suture in a running fashion.   Good hemostasis was noted. The pelvis was thoroughly irrigated and found to be hemostatic. The gas was released from the abdomen and the trochars were removed.  The skin was closed with 4-0 Vicryl and Dermabond.     A cystoscopy was then performed and efflux noted at bilateral ureteral orifices. No bladder injury noted.     The  patient went to the postanesthesia recovery room in stable condition.     Cici Kam MD, FACOG, [facility]  11/29/2022 7:01 AM     Cont with Dysphagia level 3 soft diet with NTL/yes

## 2023-04-17 NOTE — DISCUSSION/SUMMARY
[FreeTextEntry1] : Very pleasant 89 yo F with PMH of HTN, PPM, left hip fracture s/p left hemiarthroplasty on 5/14/21 with subsequent dislocation (now wheelchair bound) , chronic HFpEF, severe aortic stenosis s/p BAV 5/14/2021 who presents for follow up.\par \par 1. Severe AS s/p BAV 5/14/21: I reviewed the echo from 4/29/22 - no significant change in transaortic gradients, mild AI. Saw [facility] team in Sept 2021- Not a candidate of TAVR (wheelchair bound). Cont to monitor symptoms (CP, SOB, dizziness). She is currently satisfied with her quality of life. Repeat echo today on 10/18/22 shows stable transaortic gradients in the moderate to severe range. \par 2. LE edema: She is off all meds. Will give Rx for Lasix 20 mg qd prn, if needed. \par 3. HFpEF: No symptoms (wheelchair bound). Lasix 20 mg qd prn as above. \par 4. HTN: Hypertensive today in setting of being stressed getting here.  /60-70 mmHg at home. Cont to monitor. \par 5. Hyperlipidemia - Used to be on Lipitor 10 mg qhs. Now off, which is probably OK given advanced age.  \par 6. s/p PPM: Follows Dr. Villatoro. \par 7. Will check all labs next visit. Pt is tired today already from the visit. She will also get a PMD near her house. \par \par

## 2023-04-17 NOTE — CARDIOLOGY SUMMARY
[de-identified] : 7/19/22: NSR, LVH, poor R wave progression\par 10/1/21 : nsr, first degree AV block, poor R wave progression [de-identified] : 10/18/22: ECHO - normal biventricular systolic function, mild DD, no effusion. AS gradients stable - moderate to severe.\par 5/14/21: Severe AS, PV 4m/s, mean: 36 mmHg, SONYA ).67 cm2, DVI: 0.26, Gr 1 diastolic dysfunction, Normal Biv fxn., mild MSD, 3 mm\par 3 mm Hg at 64 bpm, PASP 39 mmHg, no pericardial effusion.\par 9/13/21: Moderate AS, PV 3.2 m/s, mean 23 mmHg, SONYA 1 cm2, Gr 1 diastolic dysfunction, Normal Biv Fxn, PASP 37 mmHg.\par 4/29/22: Mod to severe AS, peak V: 3.1, mean gradient 21, Normal Biv fxn, mod LVH,PASP 42 mmHg, no effusion.\par  [de-identified] : WILLIAM Venous duplex Oct 1,21: 1. Significantly limited assessment of the left lower extremity ( difficulty assessing left thigh and calf).2. No deep vein thrombosis in the visualized veins.

## 2023-04-17 NOTE — REVIEW OF SYSTEMS
[Fever] : no fever [Headache] : no headache [Chills] : no chills [Feeling Fatigued] : not feeling fatigued [Weight Loss (___ Lbs)] : no recent weight loss [Eye Pain] : no eye pain [Earache] : no earache [Sore Throat] : no sore throat [SOB] : no shortness of breath [Dyspnea on exertion] : not dyspnea during exertion [Chest Discomfort] : no chest discomfort [Lower Ext Edema] : no extremity edema [Leg Claudication] : no intermittent leg claudication [Palpitations] : no palpitations [Orthopnea] : no orthopnea [PND] : no PND [Syncope] : no syncope [Cough] : no cough [Wheezing] : no wheezing [Dysuria] : no dysuria [Joint Pain] : joint pain [Joint Swelling] : no joint swelling [Joint Stiffness] : joint stiffness [Muscle Cramps] : no muscle cramps [Myalgia] : no myalgia [Itching] : no itching [Dizziness] : no dizziness [Speech Disturbance] : no speech disturbance [Confusion] : no confusion was observed [Easy Bleeding] : no tendency for easy bleeding [Negative] : Psychiatric

## 2023-04-17 NOTE — HISTORY OF PRESENT ILLNESS
[FreeTextEntry1] : Very pleasant 89 yo F with PMH of HTN, PPM, left hip fracture s/p left hemiarthroplasty on 5/14/21 with subsequent dislocation (now wheelchair bound), chronic HFpEF, severe aortic stenosis s/p BAV 5/14/2021 who presents for follow up.  \par \par The patient had a mechanical fall in May 2021 which led to a left femoral hip fracture. While in the hospital, the patient was found to have severe aortic stenosis. The patient had a BAV on 5/14/2021 followed by a left hip hemiarthroplasty on 5/15/2021. She was discharged to SNF. While there, she continued to experience left hip pain and was found to have a dislocation of the prosthesis. Per the family, she was told by the orthopedic surgeon that she would be very high risk for a reoperation to fix the hip. Since then, she remains mostly in a wheelchair and dose not walk as any weight bearing is painful. 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 \par  \par 4/18/23:\par \par 10/18/22: She denies any chest pain, SOB, STEPHENS, orthopnea, PND, dizziness, near syncope, syncope and palpitations.Leg edema has improved significantly and stopped taking her lasix completely. She is off all meds. Her BP is elevated today but she says it is because she was stressed getting here. Her BP usually is 130/60-70 mmHg at home. \par \par \par \par

## 2023-04-17 NOTE — PHYSICAL EXAM
[Frail] : frail [Normal S1, S2] : normal S1, S2 [Murmur] : murmur [Normal] : soft, non-tender, no masses/organomegaly, normal bowel sounds [Normal Speech] : normal speech [Alert and Oriented] : alert and oriented [de-identified] : CHIQUI Gr3/6 radiating to carotids [de-identified] : 4/6 systolic murmur at RUSB [de-identified] : trace edema b/l

## 2023-04-18 ENCOUNTER — APPOINTMENT (OUTPATIENT)
Dept: HEART AND VASCULAR | Facility: CLINIC | Age: 88
End: 2023-04-18

## 2023-05-09 ENCOUNTER — RX RENEWAL (OUTPATIENT)
Age: 88
End: 2023-05-09

## 2023-06-19 NOTE — DIETITIAN INITIAL EVALUATION ADULT. - ADD RECOMMEND
Showering Before Surgery      Your surgeon has asked you to take 2 showers before surgery.    Why is this important?  It is normal for bacteria (germs) to be on your skin. The skin protects us from these germs. When you have surgery, we cut the skin. Sometimes germs get into the cuts and cause infection (illness caused by germs). By following the instructions below and using special soap, you will lower the number of germs on your skin. This decreases your chance of infection.  Special soap  Buy or get 8 ounces of antiseptic surgical soap called 4% CHG. Common name brands of this soap are Hibiclens and Exidine.  You can find it at your local pharmacy, clinic or retail store. If you have trouble, ask your pharmacist to help you find the right substitute.  A note about shaving:  Do not shave within 12 inches of your incision (surgical cut) area for at least 3 days before surgery. Shaving can make small cuts in the skin. This puts you at a higher risk of infection.  Items you will need for each shower:  1 newly washed towel  4 ounces of one of the above soaps  Clean pajamas or clothes to change into    Follow these instructions:  Follow these steps the evening before surgery and the morning of surgery.  Wash your hair and body with your regular shampoo and soap. Make sure you rinse the shampoo and soap from your hair and body.  Using clean hands, apply about 2 ounces of soap gently on your skin from your ear lobes to your toes. Use on your groin area last. Do not use this soap on your face or head. If you get any soap in your eyes, ears or mouth, rinse right away.  Repeat step 2. It is very important to let the soap stay on your skin for at least 1 minute.    Rinse well and dry off using a clean towel.    If you feel any tingling, itching or other irritation, rinse right away. It is normal to feel some coolness on the skin after using the antiseptic soap. Your skin may feel a bit dry after the shower, but do not use  any lotions, creams or moisturizers. Do not use hair spray or other products in your hair.  5.  Dress in freshly washed clothes or pajamas. Use fresh pillowcases and sheets on your bed.    Repeat these steps the morning of surgery.  If you have any questions about showering or an allergy to CHG soap, please call your surgery center.     1. Cont to adjust diet consistency per SLP eval 2. Cont to monitor lytes and replete prn 3. Pain and bowel regime per team 4. RD diet edu prn

## 2023-06-20 ENCOUNTER — NON-APPOINTMENT (OUTPATIENT)
Age: 88
End: 2023-06-20

## 2023-06-20 ENCOUNTER — APPOINTMENT (OUTPATIENT)
Dept: HEART AND VASCULAR | Facility: CLINIC | Age: 88
End: 2023-06-20
Payer: MEDICARE

## 2023-06-20 VITALS — DIASTOLIC BLOOD PRESSURE: 71 MMHG | HEART RATE: 64 BPM | SYSTOLIC BLOOD PRESSURE: 201 MMHG

## 2023-06-20 VITALS
DIASTOLIC BLOOD PRESSURE: 69 MMHG | HEIGHT: 59 IN | OXYGEN SATURATION: 95 % | HEART RATE: 65 BPM | TEMPERATURE: 95 F | SYSTOLIC BLOOD PRESSURE: 202 MMHG

## 2023-06-20 PROCEDURE — 99214 OFFICE O/P EST MOD 30 MIN: CPT

## 2023-06-20 PROCEDURE — 93000 ELECTROCARDIOGRAM COMPLETE: CPT

## 2023-06-20 RX ORDER — FUROSEMIDE 20 MG/1
20 TABLET ORAL
Qty: 90 | Refills: 0 | Status: ACTIVE | COMMUNITY
Start: 2022-10-18 | End: 1900-01-01

## 2023-06-20 NOTE — PHYSICAL EXAM
[Frail] : frail [Normal S1, S2] : normal S1, S2 [Murmur] : murmur [Normal] : soft, non-tender, no masses/organomegaly, normal bowel sounds [Normal Speech] : normal speech [Alert and Oriented] : alert and oriented [de-identified] : CHIQUI Gr3/6 radiating to carotids [de-identified] : 4/6 systolic murmur at RUSB [de-identified] : trace edema b/l

## 2023-06-20 NOTE — DISCUSSION/SUMMARY
[FreeTextEntry1] : Very pleasant 89 yo F with PMH of HTN, PPM, left hip fracture s/p left hemiarthroplasty on 5/14/21 with subsequent dislocation (now wheelchair bound) , chronic HFpEF, severe aortic stenosis s/p BAV 5/14/2021 who presents for follow up.\par \par 1. Severe AS s/p BAV 5/14/21: I reviewed the echo from 10/18/22 - moderate to severe AS (and by exam as well - not severe) no significant change in transaortic gradients, mild AI. Saw [facility] team in Sept 2021- Not a candidate of TAVR (wheelchair bound). Cont to monitor symptoms (CP, SOB, dizziness). She is currently satisfied with her quality of life. Repeat echo in 6 mos\par 2. LE edema: She is off all meds. Will give Rx for Lasix 20 mg qd prn, if needed. \par 3. HFpEF: No symptoms (wheelchair bound). Lasix 20 mg qd prn as above. \par 4. HTN: Hypertensive today in setting of being stressed getting here.  BP in the 130's at home per pt. Cont to monitor. \par 5. Hyperlipidemia - Used to be on Lipitor 10 mg qhs. Now off, which is probably OK given advanced age.  \par 6. s/p PPM: Follows Dr. Villatoro. \par 7. Pt will check labs with PMD next week\par \par  [EKG obtained to assist in diagnosis and management of assessed problem(s)] : EKG obtained to assist in diagnosis and management of assessed problem(s)

## 2023-06-20 NOTE — HISTORY OF PRESENT ILLNESS
[FreeTextEntry1] : Very pleasant 89 yo F with PMH of HTN, PPM, left hip fracture s/p left hemiarthroplasty on 5/14/21 with subsequent dislocation (now wheelchair bound), chronic HFpEF, severe aortic stenosis s/p BAV 5/14/2021 who presents for follow up.  \par \par The patient had a mechanical fall in May 2021 which led to a left femoral hip fracture. While in the hospital, the patient was found to have severe aortic stenosis. The patient had a BAV on 5/14/2021 followed by a left hip hemiarthroplasty on 5/15/2021. She was discharged to SNF. While there, she continued to experience left hip pain and was found to have a dislocation of the prosthesis. Per the family, she was told by the orthopedic surgeon that she would be very high risk for a reoperation to fix the hip. Since then, she remains mostly in a wheelchair and dose not walk as any weight bearing is painful. 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 \par  \par 6/20/23: No chest pain. No shortness of breath. No dyspnea. Unlimited exercise tolerance. No orthopnea. No PND.\par She does not want any medications for her high BP. She says her BP is high only bc she was nervous about the car getting to her and picking her up on time. Usually, the BP is much better controlled. She does not want any interventions. \par \par 10/18/22: She denies any chest pain, SOB, STEPHENS, orthopnea, PND, dizziness, near syncope, syncope and palpitations.Leg edema has improved significantly and stopped taking her lasix completely. She is off all meds. Her BP is elevated today but she says it is because she was stressed getting here. Her BP usually is 130/60-70 mmHg at home. \par \par \par \par

## 2023-06-20 NOTE — CARDIOLOGY SUMMARY
[de-identified] : 6/20/23: NSR, 1st degree AVB, LVH\par 7/19/22: NSR, LVH, poor R wave progression\par 10/1/21 : nsr, first degree AV block, poor R wave progression [de-identified] : 10/18/22: ECHO - normal biventricular systolic function, mild DD, no effusion. AS gradients stable - moderate to severe.\par 5/14/21: Severe AS, PV 4m/s, mean: 36 mmHg, SONYA ).67 cm2, DVI: 0.26, Gr 1 diastolic dysfunction, Normal Biv fxn., mild MSD, 3 mm\par 3 mm Hg at 64 bpm, PASP 39 mmHg, no pericardial effusion.\par 9/13/21: Moderate AS, PV 3.2 m/s, mean 23 mmHg, SONYA 1 cm2, Gr 1 diastolic dysfunction, Normal Biv Fxn, PASP 37 mmHg.\par 4/29/22: Mod to severe AS, peak V: 3.1, mean gradient 21, Normal Biv fxn, mod LVH,PASP 42 mmHg, no effusion.\par  [de-identified] : WILLIAM Venous duplex Oct 1,21: 1. Significantly limited assessment of the left lower extremity ( difficulty assessing left thigh and calf).2. No deep vein thrombosis in the visualized veins.

## 2023-07-13 NOTE — DISCHARGE NOTE NURSING/CASE MANAGEMENT/SOCIAL WORK - PATIENT PORTAL LINK FT
You can access the FollowMyHealth Patient Portal offered by [facility] by registering at the following website: [email or web address]. By joining [employer]’s FollowMyHealth portal, you will also be able to view your health information using other applications (apps) compatible with our system.
13-Jul-2023 05:39

## 2023-08-25 ENCOUNTER — APPOINTMENT (OUTPATIENT)
Dept: HEART AND VASCULAR | Facility: CLINIC | Age: 88
End: 2023-08-25
Payer: MEDICARE

## 2023-08-25 VITALS — TEMPERATURE: 97.3 F | SYSTOLIC BLOOD PRESSURE: 190 MMHG | HEART RATE: 68 BPM | DIASTOLIC BLOOD PRESSURE: 80 MMHG

## 2023-08-25 PROCEDURE — 99213 OFFICE O/P EST LOW 20 MIN: CPT

## 2023-08-25 PROCEDURE — 93280 PM DEVICE PROGR EVAL DUAL: CPT

## 2023-11-10 ENCOUNTER — OUTPATIENT (OUTPATIENT)
Dept: OUTPATIENT SERVICES | Facility: HOSPITAL | Age: 88
LOS: 1 days | End: 2023-11-10
Payer: MEDICARE

## 2023-11-10 ENCOUNTER — APPOINTMENT (OUTPATIENT)
Dept: HEART AND VASCULAR | Facility: CLINIC | Age: 88
End: 2023-11-10
Payer: MEDICARE

## 2023-11-10 VITALS
HEART RATE: 65 BPM | TEMPERATURE: 97.4 F | SYSTOLIC BLOOD PRESSURE: 174 MMHG | OXYGEN SATURATION: 98 % | DIASTOLIC BLOOD PRESSURE: 67 MMHG

## 2023-11-10 DIAGNOSIS — I35.0 NONRHEUMATIC AORTIC (VALVE) STENOSIS: ICD-10-CM

## 2023-11-10 PROCEDURE — 93306 TTE W/DOPPLER COMPLETE: CPT

## 2023-11-10 PROCEDURE — 99214 OFFICE O/P EST MOD 30 MIN: CPT

## 2023-11-10 PROCEDURE — 93306 TTE W/DOPPLER COMPLETE: CPT | Mod: 26

## 2024-02-08 NOTE — PRE-OP CHECKLIST - INTERNAL PROSTHESES
----- Message from Marcela Dobbins MD sent at 2/7/2024 11:54 AM CST -----  Regarding: check on stone symptoms  Please call patient to check on symptoms from stone, thanks.  If fevers, worsening pain, he needs to go to ED.    Sent on 02/07/24.  Marcela Dobbisn MD       no

## 2024-02-16 ENCOUNTER — APPOINTMENT (OUTPATIENT)
Dept: HEART AND VASCULAR | Facility: CLINIC | Age: 89
End: 2024-02-16
Payer: MEDICARE

## 2024-02-16 VITALS
HEART RATE: 64 BPM | SYSTOLIC BLOOD PRESSURE: 140 MMHG | TEMPERATURE: 97.8 F | HEIGHT: 59 IN | BODY MASS INDEX: 26.21 KG/M2 | WEIGHT: 130 LBS | DIASTOLIC BLOOD PRESSURE: 69 MMHG

## 2024-02-16 PROCEDURE — 99213 OFFICE O/P EST LOW 20 MIN: CPT

## 2024-02-16 PROCEDURE — G2211 COMPLEX E/M VISIT ADD ON: CPT

## 2024-02-16 PROCEDURE — 93280 PM DEVICE PROGR EVAL DUAL: CPT

## 2024-02-16 NOTE — PROCEDURE
[No] : not [NSR] : normal sinus rhythm [Pacemaker] : pacemaker [DDD] : DDD [Threshold Testing Performed] : Threshold testing was performed [Lead Imp:  ___ohms] : lead impedance was [unfilled] ohms [Sensing Amplitude ___mv] : sensing amplitude was [unfilled] mv [___V @] : [unfilled] V [___ ms] : [unfilled] ms [None] : none [Asense-Vsense ___ %] : Asense-Vsense [unfilled]% [Asense-Vpace ___ %] : Asense-Vpace [unfilled]% [Apace-Vpace ___ %] : Apace-Vpace [unfilled]% [de-identified] : Medtronic [de-identified] : [employer] MRI [de-identified] : 10/31/2016 [de-identified] : 60 [de-identified] : 3.5 years [de-identified] : see paceart

## 2024-02-16 NOTE — HISTORY OF PRESENT ILLNESS
[de-identified] : 89F, turning 89 on Sunday, with PMH of HTN, PPM, left hip fracture s/p left hemiarthroplasty on 5/14/21 with subsequent dislocation (now wheelchair bound) , chronic HFpEF, severe aortic stenosis s/p BAV 5/14/2021 who presents for routine pacemaker follow up. The device was implanted at [facility] in 10/2016.  She has no pacemaker related complaints.  The patient denies chest pain, SOB, STEPHENS, palpitation, light headedness, syncope or change in exertional capacity - she is wheelchair bound.

## 2024-05-10 ENCOUNTER — APPOINTMENT (OUTPATIENT)
Dept: HEART AND VASCULAR | Facility: CLINIC | Age: 89
End: 2024-05-10
Payer: MEDICARE

## 2024-05-10 VITALS
RESPIRATION RATE: 15 BRPM | OXYGEN SATURATION: 96 % | HEART RATE: 67 BPM | SYSTOLIC BLOOD PRESSURE: 150 MMHG | DIASTOLIC BLOOD PRESSURE: 70 MMHG | WEIGHT: 130 LBS | BODY MASS INDEX: 26.21 KG/M2 | HEIGHT: 59 IN

## 2024-05-10 DIAGNOSIS — R60.0 LOCALIZED EDEMA: ICD-10-CM

## 2024-05-10 DIAGNOSIS — I50.32 CHRONIC DIASTOLIC (CONGESTIVE) HEART FAILURE: ICD-10-CM

## 2024-05-10 DIAGNOSIS — R00.1 BRADYCARDIA, UNSPECIFIED: ICD-10-CM

## 2024-05-10 DIAGNOSIS — I35.0 NONRHEUMATIC AORTIC (VALVE) STENOSIS: ICD-10-CM

## 2024-05-10 DIAGNOSIS — Z95.0 PRESENCE OF CARDIAC PACEMAKER: ICD-10-CM

## 2024-05-10 DIAGNOSIS — I10 ESSENTIAL (PRIMARY) HYPERTENSION: ICD-10-CM

## 2024-05-10 PROCEDURE — 99214 OFFICE O/P EST MOD 30 MIN: CPT

## 2024-05-10 PROCEDURE — 93000 ELECTROCARDIOGRAM COMPLETE: CPT

## 2024-05-10 RX ORDER — AMLODIPINE BESYLATE 5 MG/1
5 TABLET ORAL DAILY
Qty: 90 | Refills: 3 | Status: ACTIVE | COMMUNITY
Start: 2023-11-10 | End: 1900-01-01

## 2024-05-10 NOTE — REVIEW OF SYSTEMS
[Fever] : no fever [Headache] : no headache [Chills] : no chills [Feeling Fatigued] : not feeling fatigued [Weight Loss (___ Lbs)] : no recent weight loss [Eye Pain] : no eye pain [Earache] : no earache [Sore Throat] : no sore throat [SOB] : no shortness of breath [Dyspnea on exertion] : not dyspnea during exertion [Chest Discomfort] : no chest discomfort [Lower Ext Edema] : no extremity edema [Leg Claudication] : no intermittent leg claudication [Palpitations] : no palpitations [Orthopnea] : no orthopnea [PND] : no PND [Syncope] : no syncope [Cough] : no cough [Wheezing] : no wheezing [Dysuria] : no dysuria [Joint Swelling] : no joint swelling [Muscle Cramps] : no muscle cramps [Myalgia] : no myalgia [Itching] : no itching [Dizziness] : no dizziness [Speech Disturbance] : no speech disturbance [Confusion] : no confusion was observed [Easy Bleeding] : no tendency for easy bleeding

## 2024-05-10 NOTE — PHYSICAL EXAM
[de-identified] : CHIQUI Gr3/6 radiating to carotids [de-identified] : 4/6 systolic murmur at RUSB [de-identified] : trace edema b/l

## 2024-05-10 NOTE — CARDIOLOGY SUMMARY
[de-identified] : 5/1024: NSR, 1st degree AVB, IVCD 6/20/23: NSR, 1st degree AVB, LVH 7/19/22: NSR, LVH, poor R wave progression 10/1/21 : nsr, first degree AV block, poor R wave progression [de-identified] : 11/10/23: Normal biv systolic fxn. paradoxical low flow low gradient severe AS. Mild DD. No change in gradients from prior  10/18/22: ECHO - normal biventricular systolic function, mild DD, no effusion. AS gradients stable - moderate to severe. 5/14/21: Severe AS, PV 4m/s, mean: 36 mmHg, SONYA ).67 cm2, DVI: 0.26, Gr 1 diastolic dysfunction, Normal Biv fxn., mild MSD, 3 mm 3 mm Hg at 64 bpm, PASP 39 mmHg, no pericardial effusion. 9/13/21: Moderate AS, PV 3.2 m/s, mean 23 mmHg, SONYA 1 cm2, Gr 1 diastolic dysfunction, Normal Biv Fxn, PASP 37 mmHg. 4/29/22: Mod to severe AS, peak V: 3.1, mean gradient 21, Normal Biv fxn, mod LVH,PASP 42 mmHg, no effusion.   [de-identified] : WILLIAM Venous duplex Oct 1,21: 1. Significantly limited assessment of the left lower extremity ( difficulty assessing left thigh and calf).2. No deep vein thrombosis in the visualized veins.

## 2024-05-10 NOTE — DISCUSSION/SUMMARY
[FreeTextEntry1] : Very pleasant 89 yo F with PMH of HTN, PPM, left hip fracture s/p left hemiarthroplasty on 5/14/21 with subsequent dislocation (now wheelchair bound) , chronic HFpEF, severe aortic stenosis s/p BAV 5/14/2021 who presents for follow up.  1. Severe AS s/p BAV 5/14/21: I reviewed the echo from 11/10/23 - moderate to severe AS (by exam) no significant change in transaortic gradients, mild AI. Saw [facility] team in Sept 2021- Not a candidate of TAVR (wheelchair bound). Cont to monitor symptoms (CP, SOB, dizziness). She is currently satisfied with her quality of life. Repeat echo in 6 mos - November 2024 2. LE edema: Trace edema b/l  Cont Lasix 20 mg qd prn, if needed.  3. HFpEF: No symptoms (wheelchair bound). Lasix 20 mg qd prn as above.  4. HTN: Slightly hypertensive today but not as before. She is asymptomatic and is not mobile so unlikely goes higher than this and patient thinks it's only this high in the doctor's office. Cont Amlodipine 5 mg qd. Cont to monitor.  5. Hyperlipidemia - Used to be on Lipitor 10 mg qhs. Now off, which is probably OK given advanced age.   6. s/p PPM: Follows Dr. Villatoro.  7. Lab check today incl CBC, CMP, TSH, A1c, lipids, BNP   [EKG obtained to assist in diagnosis and management of assessed problem(s)] : EKG obtained to assist in diagnosis and management of assessed problem(s)

## 2024-05-10 NOTE — HISTORY OF PRESENT ILLNESS
[FreeTextEntry1] : Very pleasant 89 yo F with PMH of HTN, PPM, left hip fracture s/p left hemiarthroplasty on 5/14/21 with subsequent dislocation (now wheelchair bound), chronic HFpEF, severe aortic stenosis s/p BAV 5/14/2021 who presents for follow up.    The patient had a mechanical fall in May 2021 which led to a left femoral hip fracture. While in the hospital, the patient was found to have severe aortic stenosis. The patient had a BAV on 5/14/2021 followed by a left hip hemiarthroplasty on 5/15/2021. She was discharged to SNF. While there, she continued to experience left hip pain and was found to have a dislocation of the prosthesis. Per the family, she was told by the orthopedic surgeon that she would be very high risk for a reoperation to fix the hip. Since then, she remains mostly in a wheelchair and dose not walk as any weight bearing is painful. She lives at home and has a HHA 24/7  She used to work as RN. She is well read and reads daily.   5/10/24: Patient is accompanied by her HHA. No chest pain. No shortness of breath. No dyspnea.  No orthopnea. No PND. She remains qualified with her quality of life. Edna is calm. She continues to read at least a newspaper per day.   11/10/23: No chest pain. No shortness of breath. No dyspnea.  No orthopnea. No PND. She is wheelchair bound. She does not measure her BP at home but it remains very elevated every office visit. She is agreeable to re-starting the Amlodipine. She is otherwise satisfied with her life. She has a HHA. She also has a lot of friends who check up on her.    6/20/23: No chest pain. No shortness of breath. No dyspnea.  No orthopnea. No PND. She does not want any medications for her high BP. She says her BP is high only bc she was nervous about the car getting to her and picking her up on time. Usually, the BP is much better controlled. She does not want any interventions.   10/18/22: She denies any chest pain, SOB, STEPHENS, orthopnea, PND, dizziness, near syncope, syncope and palpitations.Leg edema has improved significantly and stopped taking her lasix completely. She is off all meds. Her BP is elevated today but she says it is because she was stressed getting here. Her BP usually is 130/60-70 mmHg at home.

## 2024-05-13 ENCOUNTER — NON-APPOINTMENT (OUTPATIENT)
Age: 89
End: 2024-05-13

## 2024-05-13 LAB
25(OH)D3 SERPL-MCNC: 35.1 NG/ML
ALBUMIN SERPL ELPH-MCNC: 4 G/DL
ALP BLD-CCNC: 65 U/L
ALT SERPL-CCNC: 6 U/L
ANION GAP SERPL CALC-SCNC: 11 MMOL/L
AST SERPL-CCNC: 18 U/L
BILIRUB SERPL-MCNC: 0.6 MG/DL
BUN SERPL-MCNC: 13 MG/DL
CALCIUM SERPL-MCNC: 9.4 MG/DL
CHLORIDE SERPL-SCNC: 102 MMOL/L
CHOLEST SERPL-MCNC: 185 MG/DL
CO2 SERPL-SCNC: 27 MMOL/L
CREAT SERPL-MCNC: 0.7 MG/DL
EGFR: 77 ML/MIN/1.73M2
ESTIMATED AVERAGE GLUCOSE: 105 MG/DL
GLUCOSE SERPL-MCNC: 82 MG/DL
HBA1C MFR BLD HPLC: 5.3 %
HCT VFR BLD CALC: 45.2 %
HDLC SERPL-MCNC: 52 MG/DL
HGB BLD-MCNC: 14.4 G/DL
LDLC SERPL CALC-MCNC: 108 MG/DL
MAGNESIUM SERPL-MCNC: 2 MG/DL
MCHC RBC-ENTMCNC: 28.6 PG
MCHC RBC-ENTMCNC: 31.9 GM/DL
MCV RBC AUTO: 89.9 FL
NONHDLC SERPL-MCNC: 132 MG/DL
NT-PROBNP SERPL-MCNC: 510 PG/ML
PLATELET # BLD AUTO: 258 K/UL
POTASSIUM SERPL-SCNC: 4.6 MMOL/L
PROT SERPL-MCNC: 6.9 G/DL
RBC # BLD: 5.03 M/UL
RBC # FLD: 15.2 %
SODIUM SERPL-SCNC: 141 MMOL/L
TRIGL SERPL-MCNC: 138 MG/DL
TSH SERPL-ACNC: 7.26 UIU/ML
WBC # FLD AUTO: 8.28 K/UL

## 2024-08-14 NOTE — BRIEF OPERATIVE NOTE - NSICDXBRIEFOPLAUNCH_GEN_ALL_CORE
[facility] Peds/Adult Note                   Date: 2024    Name: Edda Stephenson    MRN: [id number]       : 1960    1000 Patient arrives for Belapectin/Placebo Research Infusion without acute problems. Please see Epic for complete assessment and education provided.    Infusion was not delayed or interrupted     Vital signs stable throughout and prior to discharge. Patient tolerated procedure well and was discharged without incident.  Patient is aware of next [facility] appointment on 2024. Appointment card give to the patient.    Ms. Stephenson's vitals were reviewed prior to and after treatment.   Patient Vitals for the past 12 hrs:   Temp Pulse Resp BP SpO2   24 1208 -- 76 18 (!) 122/55 --   24 1000 97.6 °F (36.4 °C) 81 18 (!) 92/57 98 %       Medications given:   Medications Administered         (Belapectin/Placebo) investigational 1 each in sodium chloride 0.9 % 100 mL IVPB Admin Date  2024 Action  New Bag Dose   Rate  100 mL/hr Route  IntraVENous Documented By  María Daley, RN              Ms. Stephenson tolerated the infusion, and had no complaints.    [facility].     Future Appointments   Date Time Provider Department Center   2024  8:20 AM [facility]_NURSE_RESEARCH [facility]   2024  9:00 AM PEDS FASTTRACK 2 [facility]   2024  8:20 AM [facility]NURSE_RESEARCH [facility]   2024  9:00 AM PEDS FASTTRACK 2 [facility]   2024  8:20 AM [facility]NURSE_RESEARCH [facility]   2024  9:00 AM PEDS FASTTRACK 2 [facility]   10/7/2024  8:20 AM [facility]NURSE_RESEARCH [facility]   10/7/2024  9:00 AM PEDS FASTTRACK 2 [facility]   10/21/2024  8:20 AM [facility]NURSE_RESEARCH [facility]   10/21/2024  9:00 AM PEDS FASTTRACK 2 [facility]   2024  8:00 AM Blanche Guallpa APRN - NP [facility]   2024  9:00 AM [facility] [place]   2024  8:20 AM [facility]_NURSE_RESEARCH 
<--- Click to Launch ICDx for PreOp, PostOp and Procedure
<--- Click to Launch ICDx for PreOp, PostOp and Procedure

## 2024-08-15 ENCOUNTER — APPOINTMENT (OUTPATIENT)
Dept: HEART AND VASCULAR | Facility: CLINIC | Age: 89
End: 2024-08-15
Payer: MEDICARE

## 2024-08-15 ENCOUNTER — NON-APPOINTMENT (OUTPATIENT)
Age: 89
End: 2024-08-15

## 2024-08-15 VITALS
BODY MASS INDEX: 26.21 KG/M2 | HEIGHT: 59 IN | HEART RATE: 67 BPM | WEIGHT: 130 LBS | SYSTOLIC BLOOD PRESSURE: 145 MMHG | DIASTOLIC BLOOD PRESSURE: 66 MMHG | OXYGEN SATURATION: 98 %

## 2024-08-15 DIAGNOSIS — Z95.0 PRESENCE OF CARDIAC PACEMAKER: ICD-10-CM

## 2024-08-15 PROCEDURE — 93284 PRGRMG EVAL IMPLANTABLE DFB: CPT

## 2024-08-15 PROCEDURE — 93280 PM DEVICE PROGR EVAL DUAL: CPT | Mod: 59

## 2024-08-16 NOTE — PROCEDURE
[No] : not [NSR] : normal sinus rhythm [Pacemaker] : pacemaker [DDD] : DDD [Threshold Testing Performed] : Threshold testing was performed [Lead Imp:  ___ohms] : lead impedance was [unfilled] ohms [___V @] : [unfilled] V [___ ms] : [unfilled] ms [None] : none [Asense-Vsense ___ %] : Asense-Vsense [unfilled]% [Asense-Vpace ___ %] : Asense-Vpace [unfilled]% [Apace-Vpace ___ %] : Apace-Vpace [unfilled]% [Sensing Amplitude ___mv] : sensing amplitude was [unfilled] mv [Apace-Vsense ___ %] : Apace-Vsense [unfilled]% [de-identified] : Medtronic [de-identified] : [employer] MRI [de-identified] : 10/31/2016 [de-identified] : 60 [de-identified] : 3.5 years [de-identified] : see paceart

## 2024-08-16 NOTE — HISTORY OF PRESENT ILLNESS
[de-identified] : Patient is a 89 y/o F, with PMH of HTN, Medtronic Dual-Chamber PPM, left hip fracture s/p left hemiarthroplasty on 5/14/21 with subsequent dislocation (now wheelchair bound) , chronic HFpEF, severe aortic stenosis s/p BAV 5/14/2021 who presents for routine pacemaker follow up. The device was implanted at [facility] in 10/2016.  She has no overall complaints.   The patient denies chest pain, SOB, STEPHENS, palpitation, light headedness, syncope or change in exertional capacity - she is wheelchair bound.

## 2024-08-16 NOTE — DISCUSSION/SUMMARY
[Pacemaker Function Normal] : normal pacemaker function [FreeTextEntry1] : 89 y/o F, with PMH of HTN, Medtronic Dual-Chamber PPM, left hip fracture s/p left hemiarthroplasty on 5/14/21 with subsequent dislocation (now wheelchair bound) , chronic HFpEF, severe aortic stenosis s/p BAV 5/14/2021 who presents for routine pacemaker follow up.   1) Device Check  - Pacemaker functioning properly. See above and paceart for interrogation.  - She will continue to be monitored via remote monitoring.  - Return in 6 months or sooner if she has any questions or concerns.

## 2024-08-16 NOTE — HISTORY OF PRESENT ILLNESS
[de-identified] : Patient is a 89 y/o F, with PMH of HTN, Medtronic Dual-Chamber PPM, left hip fracture s/p left hemiarthroplasty on 5/14/21 with subsequent dislocation (now wheelchair bound) , chronic HFpEF, severe aortic stenosis s/p BAV 5/14/2021 who presents for routine pacemaker follow up. The device was implanted at [facility] in 10/2016.  She has no overall complaints.   The patient denies chest pain, SOB, STEPHENS, palpitation, light headedness, syncope or change in exertional capacity - she is wheelchair bound.

## 2024-08-16 NOTE — PROCEDURE
[No] : not [NSR] : normal sinus rhythm [Pacemaker] : pacemaker [DDD] : DDD [Threshold Testing Performed] : Threshold testing was performed [Lead Imp:  ___ohms] : lead impedance was [unfilled] ohms [___V @] : [unfilled] V [___ ms] : [unfilled] ms [None] : none [Asense-Vsense ___ %] : Asense-Vsense [unfilled]% [Asense-Vpace ___ %] : Asense-Vpace [unfilled]% [Apace-Vpace ___ %] : Apace-Vpace [unfilled]% [Sensing Amplitude ___mv] : sensing amplitude was [unfilled] mv [Apace-Vsense ___ %] : Apace-Vsense [unfilled]% [de-identified] : Medtronic [de-identified] : 10/31/2016 [de-identified] : [employer] MRI [de-identified] : 60 [de-identified] : 3.5 years [de-identified] : see paceart

## 2024-08-16 NOTE — ADDENDUM
[FreeTextEntry1] :   I, Ayanna Galvan, am scribing for and the presence of Dr. Murtaza Bonds, the following sections: HPI, PMH,Family/social history, ROS, Physical Exam, Assessment / Plan.  I, Murtaza Bonds, hereby attest that the medical record entry for this patient accurately reflects signatures/notations that I made on the Date of Service in my capacity as an Attending Physician when I treated/diagnosed the above patient. I do hereby attest that this information is true, accurate and complete to the best of my knowledge.  I was present for the entire visit and supervised the entire visit including assessing the patient, conducting exam and establishing the plan of care.  I personally performed the evaluation and management services and agree with the plan as outlined above.

## 2024-11-15 ENCOUNTER — RESULT REVIEW (OUTPATIENT)
Age: 89
End: 2024-11-15

## 2024-11-15 ENCOUNTER — APPOINTMENT (OUTPATIENT)
Dept: HEART AND VASCULAR | Facility: CLINIC | Age: 89
End: 2024-11-15
Payer: MEDICARE

## 2024-11-15 ENCOUNTER — LABORATORY RESULT (OUTPATIENT)
Age: 89
End: 2024-11-15

## 2024-11-15 ENCOUNTER — NON-APPOINTMENT (OUTPATIENT)
Age: 89
End: 2024-11-15

## 2024-11-15 ENCOUNTER — OUTPATIENT (OUTPATIENT)
Dept: OUTPATIENT SERVICES | Facility: HOSPITAL | Age: 89
LOS: 1 days | End: 2024-11-15
Payer: MEDICARE

## 2024-11-15 VITALS
BODY MASS INDEX: 26.21 KG/M2 | DIASTOLIC BLOOD PRESSURE: 65 MMHG | TEMPERATURE: 97.7 F | OXYGEN SATURATION: 93 % | HEIGHT: 59 IN | WEIGHT: 130 LBS | HEART RATE: 63 BPM | SYSTOLIC BLOOD PRESSURE: 154 MMHG

## 2024-11-15 DIAGNOSIS — I35.0 NONRHEUMATIC AORTIC (VALVE) STENOSIS: ICD-10-CM

## 2024-11-15 DIAGNOSIS — R00.1 BRADYCARDIA, UNSPECIFIED: ICD-10-CM

## 2024-11-15 DIAGNOSIS — I50.32 CHRONIC DIASTOLIC (CONGESTIVE) HEART FAILURE: ICD-10-CM

## 2024-11-15 DIAGNOSIS — I10 ESSENTIAL (PRIMARY) HYPERTENSION: ICD-10-CM

## 2024-11-15 DIAGNOSIS — R60.0 LOCALIZED EDEMA: ICD-10-CM

## 2024-11-15 DIAGNOSIS — Z95.0 PRESENCE OF CARDIAC PACEMAKER: ICD-10-CM

## 2024-11-15 PROCEDURE — 99214 OFFICE O/P EST MOD 30 MIN: CPT

## 2024-11-15 PROCEDURE — 93306 TTE W/DOPPLER COMPLETE: CPT

## 2024-11-15 PROCEDURE — 93306 TTE W/DOPPLER COMPLETE: CPT | Mod: 26

## 2024-11-15 PROCEDURE — 76376 3D RENDER W/INTRP POSTPROCES: CPT | Mod: 26

## 2024-11-15 PROCEDURE — 93000 ELECTROCARDIOGRAM COMPLETE: CPT

## 2024-11-18 LAB
ALBUMIN SERPL ELPH-MCNC: 3.6 G/DL
ALP BLD-CCNC: 66 U/L
ALT SERPL-CCNC: 5 U/L
ANION GAP SERPL CALC-SCNC: 14 MMOL/L
AST SERPL-CCNC: 16 U/L
BILIRUB SERPL-MCNC: 0.6 MG/DL
BUN SERPL-MCNC: 9 MG/DL
CALCIUM SERPL-MCNC: 9.3 MG/DL
CHLORIDE SERPL-SCNC: 103 MMOL/L
CO2 SERPL-SCNC: 25 MMOL/L
CREAT SERPL-MCNC: 0.59 MG/DL
EGFR: 80 ML/MIN/1.73M2
GLUCOSE SERPL-MCNC: 88 MG/DL
POTASSIUM SERPL-SCNC: 4.4 MMOL/L
PROT SERPL-MCNC: 6.6 G/DL
SODIUM SERPL-SCNC: 143 MMOL/L
TSH SERPL-ACNC: 5.01 UIU/ML

## 2025-03-17 ENCOUNTER — RX RENEWAL (OUTPATIENT)
Age: 89
End: 2025-03-17

## 2025-04-30 ENCOUNTER — NON-APPOINTMENT (OUTPATIENT)
Age: 89
End: 2025-04-30

## 2025-04-30 ENCOUNTER — APPOINTMENT (OUTPATIENT)
Dept: HEART AND VASCULAR | Facility: CLINIC | Age: 89
End: 2025-04-30
Payer: MEDICARE

## 2025-04-30 VITALS — HEART RATE: 71 BPM | DIASTOLIC BLOOD PRESSURE: 60 MMHG | SYSTOLIC BLOOD PRESSURE: 130 MMHG

## 2025-04-30 DIAGNOSIS — Z95.0 PRESENCE OF CARDIAC PACEMAKER: ICD-10-CM

## 2025-04-30 DIAGNOSIS — R00.1 BRADYCARDIA, UNSPECIFIED: ICD-10-CM

## 2025-04-30 PROCEDURE — 93280 PM DEVICE PROGR EVAL DUAL: CPT

## 2025-05-16 ENCOUNTER — APPOINTMENT (OUTPATIENT)
Dept: HEART AND VASCULAR | Facility: CLINIC | Age: 89
End: 2025-05-16

## 2025-05-16 DIAGNOSIS — I50.32 CHRONIC DIASTOLIC (CONGESTIVE) HEART FAILURE: ICD-10-CM

## 2025-05-16 DIAGNOSIS — I10 ESSENTIAL (PRIMARY) HYPERTENSION: ICD-10-CM

## 2025-05-16 DIAGNOSIS — R60.0 LOCALIZED EDEMA: ICD-10-CM

## 2025-05-16 DIAGNOSIS — Z95.0 PRESENCE OF CARDIAC PACEMAKER: ICD-10-CM

## 2025-05-16 DIAGNOSIS — I35.0 NONRHEUMATIC AORTIC (VALVE) STENOSIS: ICD-10-CM

## 2025-06-12 ENCOUNTER — RX RENEWAL (OUTPATIENT)
Age: 89
End: 2025-06-12

## 2025-07-25 ENCOUNTER — APPOINTMENT (OUTPATIENT)
Dept: HEART AND VASCULAR | Facility: CLINIC | Age: 89
End: 2025-07-25
Payer: MEDICARE

## 2025-07-25 VITALS
HEART RATE: 61 BPM | HEIGHT: 59 IN | TEMPERATURE: 95.2 F | SYSTOLIC BLOOD PRESSURE: 136 MMHG | OXYGEN SATURATION: 96 % | DIASTOLIC BLOOD PRESSURE: 63 MMHG

## 2025-07-25 DIAGNOSIS — I50.32 CHRONIC DIASTOLIC (CONGESTIVE) HEART FAILURE: ICD-10-CM

## 2025-07-25 DIAGNOSIS — I35.0 NONRHEUMATIC AORTIC (VALVE) STENOSIS: ICD-10-CM

## 2025-07-25 DIAGNOSIS — R00.1 BRADYCARDIA, UNSPECIFIED: ICD-10-CM

## 2025-07-25 DIAGNOSIS — R60.0 LOCALIZED EDEMA: ICD-10-CM

## 2025-07-25 DIAGNOSIS — I10 ESSENTIAL (PRIMARY) HYPERTENSION: ICD-10-CM

## 2025-07-25 DIAGNOSIS — Z95.0 PRESENCE OF CARDIAC PACEMAKER: ICD-10-CM

## 2025-07-25 PROCEDURE — 99214 OFFICE O/P EST MOD 30 MIN: CPT

## 2025-07-25 PROCEDURE — 93000 ELECTROCARDIOGRAM COMPLETE: CPT
